# Patient Record
Sex: MALE | Race: WHITE | NOT HISPANIC OR LATINO | Employment: FULL TIME | URBAN - METROPOLITAN AREA
[De-identification: names, ages, dates, MRNs, and addresses within clinical notes are randomized per-mention and may not be internally consistent; named-entity substitution may affect disease eponyms.]

---

## 2022-10-04 ENCOUNTER — OFFICE VISIT (OUTPATIENT)
Dept: PRIMARY CARE CLINIC | Facility: CLINIC | Age: 32
End: 2022-10-04
Payer: COMMERCIAL

## 2022-10-04 VITALS
HEIGHT: 68 IN | TEMPERATURE: 99 F | DIASTOLIC BLOOD PRESSURE: 68 MMHG | WEIGHT: 141.56 LBS | BODY MASS INDEX: 21.45 KG/M2 | OXYGEN SATURATION: 92 % | HEART RATE: 94 BPM | SYSTOLIC BLOOD PRESSURE: 110 MMHG | RESPIRATION RATE: 16 BRPM

## 2022-10-04 DIAGNOSIS — F41.9 ANXIETY AND DEPRESSION: ICD-10-CM

## 2022-10-04 DIAGNOSIS — F32.A ANXIETY AND DEPRESSION: ICD-10-CM

## 2022-10-04 DIAGNOSIS — Z76.89 ENCOUNTER TO ESTABLISH CARE: Primary | ICD-10-CM

## 2022-10-04 DIAGNOSIS — K44.9 HIATAL HERNIA: ICD-10-CM

## 2022-10-04 DIAGNOSIS — K21.00 GASTROESOPHAGEAL REFLUX DISEASE WITH ESOPHAGITIS WITHOUT HEMORRHAGE: ICD-10-CM

## 2022-10-04 PROCEDURE — 1160F RVW MEDS BY RX/DR IN RCRD: CPT | Mod: CPTII,S$GLB,, | Performed by: FAMILY MEDICINE

## 2022-10-04 PROCEDURE — 3074F PR MOST RECENT SYSTOLIC BLOOD PRESSURE < 130 MM HG: ICD-10-PCS | Mod: CPTII,S$GLB,, | Performed by: FAMILY MEDICINE

## 2022-10-04 PROCEDURE — 3074F SYST BP LT 130 MM HG: CPT | Mod: CPTII,S$GLB,, | Performed by: FAMILY MEDICINE

## 2022-10-04 PROCEDURE — 3078F DIAST BP <80 MM HG: CPT | Mod: CPTII,S$GLB,, | Performed by: FAMILY MEDICINE

## 2022-10-04 PROCEDURE — 3008F BODY MASS INDEX DOCD: CPT | Mod: CPTII,S$GLB,, | Performed by: FAMILY MEDICINE

## 2022-10-04 PROCEDURE — 1160F PR REVIEW ALL MEDS BY PRESCRIBER/CLIN PHARMACIST DOCUMENTED: ICD-10-PCS | Mod: CPTII,S$GLB,, | Performed by: FAMILY MEDICINE

## 2022-10-04 PROCEDURE — 1159F MED LIST DOCD IN RCRD: CPT | Mod: CPTII,S$GLB,, | Performed by: FAMILY MEDICINE

## 2022-10-04 PROCEDURE — 3008F PR BODY MASS INDEX (BMI) DOCUMENTED: ICD-10-PCS | Mod: CPTII,S$GLB,, | Performed by: FAMILY MEDICINE

## 2022-10-04 PROCEDURE — 99999 PR PBB SHADOW E&M-NEW PATIENT-LVL IV: ICD-10-PCS | Mod: PBBFAC,,, | Performed by: FAMILY MEDICINE

## 2022-10-04 PROCEDURE — 99204 OFFICE O/P NEW MOD 45 MIN: CPT | Mod: S$GLB,,, | Performed by: FAMILY MEDICINE

## 2022-10-04 PROCEDURE — 3078F PR MOST RECENT DIASTOLIC BLOOD PRESSURE < 80 MM HG: ICD-10-PCS | Mod: CPTII,S$GLB,, | Performed by: FAMILY MEDICINE

## 2022-10-04 PROCEDURE — 99204 PR OFFICE/OUTPT VISIT, NEW, LEVL IV, 45-59 MIN: ICD-10-PCS | Mod: S$GLB,,, | Performed by: FAMILY MEDICINE

## 2022-10-04 PROCEDURE — 1159F PR MEDICATION LIST DOCUMENTED IN MEDICAL RECORD: ICD-10-PCS | Mod: CPTII,S$GLB,, | Performed by: FAMILY MEDICINE

## 2022-10-04 PROCEDURE — 99999 PR PBB SHADOW E&M-NEW PATIENT-LVL IV: CPT | Mod: PBBFAC,,, | Performed by: FAMILY MEDICINE

## 2022-10-04 RX ORDER — HYDROXYZINE HYDROCHLORIDE 10 MG/5ML
SOLUTION ORAL
COMMUNITY
Start: 2022-07-11 | End: 2022-10-04 | Stop reason: ALTCHOICE

## 2022-10-04 RX ORDER — BUPROPION HYDROCHLORIDE 75 MG/1
75 TABLET ORAL 2 TIMES DAILY
Qty: 60 TABLET | Refills: 11 | Status: SHIPPED | OUTPATIENT
Start: 2022-10-04 | End: 2023-12-18

## 2022-10-04 RX ORDER — BUPROPION HYDROCHLORIDE 75 MG/1
TABLET ORAL
COMMUNITY
Start: 2022-07-11 | End: 2022-10-04 | Stop reason: ALTCHOICE

## 2022-10-04 RX ORDER — HYDROXYZINE PAMOATE 25 MG/1
25 CAPSULE ORAL EVERY 8 HOURS PRN
Qty: 90 CAPSULE | Refills: 3 | Status: SHIPPED | OUTPATIENT
Start: 2022-10-04 | End: 2023-05-16 | Stop reason: ALTCHOICE

## 2022-10-04 RX ORDER — VILAZODONE HYDROCHLORIDE 10 MG/1
10 TABLET ORAL DAILY
Qty: 90 TABLET | Refills: 3 | Status: SHIPPED | OUTPATIENT
Start: 2022-10-04 | End: 2023-12-18

## 2022-10-04 RX ORDER — VILAZODONE HYDROCHLORIDE 10 MG/1
TABLET ORAL
COMMUNITY
Start: 2022-07-11 | End: 2022-10-04 | Stop reason: SDUPTHER

## 2022-10-04 NOTE — PROGRESS NOTES
"    [facility]       [address] [place] [address]      Phone: [phone]      Fax: [phone]    Gilberto Saldivar MD                Office Visit  10/04/2022        Subjective      HPI:  Howard Dunn is a 32 y.o. male presents today in clinic for "No chief complaint on file.  ."     32-year-old gentleman presents today to establish care, discussed med refills.      Patient states he recently moved here from [place].  Has a son (age 13) who lives here with his mother.  Decided to move here to be closer to him.    States that overall, he feels okay.  No chest pains, shortness of breath.  No fever, chills, body aches.  No coughing, sneezing, URI type symptoms.  States appetite is normal.  States bowel movements are normal.  Denies any urinary issues.      Has ongoing issues with anxiety and depression.  Has tried several medications in the past.  His PCP at home eventually got him on a regimen including Wellbutrin 75 mg daily, Viibryd 10 mg daily, hydroxyzine as needed for anxiety or sleep.  States this combination works very well.  No issues with it.  Recently filled his last prescription, will need refills soon.      Also has issues with reflux, digestion.  Had EGD in the past.  Was told he had lots of inflammation, small hiatal hernia.  Was given prescription for omeprazole which he took for a short time.  Now he only uses it as needed.  He tries his best to watch his diet.  Does occasionally get some pain or cramps in his stomach at night.  Lots of gas.  Finds that his stools are often inconsistent.  Sometimes he will go multiple times per day, frequently has diarrhea.  No blood in the stool.  No black, tarry stools.    Had blood work done back in March with his PCP.  Everything was normal.      PMH: Anxiety/depression.  GERD.  Hiatal hernia.    PSH: Inguinal hernia repair as a baby.  EGD.    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    T: Denies   A:  Denies current use.  Quit February, 2021.    D: Denies    Exercise:  Walks occasionally      The following were updated and reviewed by myself in the chart: medications, past medical history, past surgical history, family history, social history, and allergies.     Medications:  Current Outpatient Medications on File Prior to Visit   Medication Sig Dispense Refill    [DISCONTINUED] buPROPion (WELLBUTRIN) 75 MG tablet       [DISCONTINUED] hydrOXYzine HCL 10 mg/5 mL (5 mL) Soln       [DISCONTINUED] vilazodone (VIIBRYD) 10 mg Tab tablet        No current facility-administered medications on file prior to visit.        PMHx:  History reviewed. No pertinent past medical history.   There are no problems to display for this patient.       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        Allergies:  Review of patient's allergies indicates:  No Known Allergies     ROS:  Review of Systems   Constitutional:  Negative for activity change, appetite change, chills and fever.   HENT:  Negative for congestion, postnasal drip, rhinorrhea, sore throat and trouble swallowing.    Respiratory:  Negative for cough and shortness of breath.    Cardiovascular:  Negative for chest pain and palpitations.   Gastrointestinal:  Positive for  "abdominal pain and diarrhea. Negative for constipation, nausea and vomiting.   Genitourinary:  Negative for difficulty urinating.   Musculoskeletal:  Negative for arthralgias and myalgias.   Skin:  Negative for color change and rash.   Neurological:  Negative for headaches.   All other systems reviewed and are negative.       Objective      /68   Pulse 94   Temp 98.8 °F (37.1 °C) (Temporal)   Resp 16   Ht 5' 8" (1.727 m)   Wt 64.2 kg (141 lb 8.6 oz)   SpO2 (!) 92%   BMI 21.52 kg/m²   Ht Readings from Last 3 Encounters:   10/04/22 5' 8" (1.727 m)     Wt Readings from Last 3 Encounters:   10/04/22 64.2 kg (141 lb 8.6 oz)       PHYSICAL EXAM:  Physical Exam  Vitals and nursing note reviewed.   Constitutional:       General: He is not in acute distress.     Appearance: Normal appearance.   HENT:      Head: Normocephalic and atraumatic.      Right Ear: Tympanic membrane, ear canal and external ear normal.      Left Ear: Tympanic membrane, ear canal and external ear normal.      Nose: Nose normal. No congestion or rhinorrhea.      Mouth/Throat:      Mouth: Mucous membranes are moist.      Pharynx: Oropharynx is clear. No oropharyngeal exudate or posterior oropharyngeal erythema.   Eyes:      Extraocular Movements: Extraocular movements intact.      Conjunctiva/sclera: Conjunctivae normal.      Pupils: Pupils are equal, round, and reactive to light.   Cardiovascular:      Rate and Rhythm: Normal rate and regular rhythm.   Pulmonary:      Effort: Pulmonary effort is normal.      Breath sounds: No wheezing, rhonchi or rales.   Abdominal:      Tenderness: There is abdominal tenderness (mild) in the epigastric area.   Musculoskeletal:         General: Normal range of motion.      Cervical back: Normal range of motion.   Lymphadenopathy:      Cervical: No cervical adenopathy.   Skin:     General: Skin is warm and dry.   Neurological:      General: No focal deficit present.      Mental Status: He is alert.    "         LABS / IMAGING:  No results found for this or any previous visit (from the past 4368 hour(s)).      Assessment    1. Encounter to establish care    2. Anxiety and depression    3. Gastroesophageal reflux disease with esophagitis without hemorrhage    4. Hiatal hernia          Plan    Diagnoses and all orders for this visit:    Encounter to establish care    Anxiety and depression  -     buPROPion (WELLBUTRIN) 75 MG tablet; Take 1 tablet (75 mg total) by mouth 2 (two) times daily.  -     hydrOXYzine pamoate (VISTARIL) 25 MG Cap; Take 1 capsule (25 mg total) by mouth every 8 (eight) hours as needed (anxiety/sleep).  -     vilazodone (VIIBRYD) 10 mg Tab tablet; Take 1 tablet (10 mg total) by mouth once daily.    Gastroesophageal reflux disease with esophagitis without hemorrhage    Hiatal hernia    Physically, looks okay today.      Reviewed recent blood work.  CBC, CMP, TSH or all WNL.  A1c equals 5.2%.  Hepatitis C negative.  Total cholesterol 149.  Triglycerides 59.  .  HDL 43.    Anxiety and depression appear to be very well controlled on current regimen.    PHQ-9 = 3 today  DESIREE-7 = 1 today    Med refills given.      Continue to manage GERD/hiatal hernia with diet, omeprazole as needed.  If symptoms worsen, can always refer to GI to repeat scope, discuss additional workup.    Follow-up in March/April for recheck, annual wellness exam.      FOLLOW-UP:  Follow up in about 6 months (around 4/4/2023) for check up, annual exam.    I spent a total of 45 minutes face to face and non-face to face on the date of this visit.This includes time preparing to see the patient (eg, review of tests, notes), obtaining and/or reviewing additional history from an independent historian and/or outside medical records, documenting clinical information in the electronic health record, independently interpreting results and/or communicating results to the patient/family/caregiver, or care coordinator.    Signed by:  Gilberto  CEASAR Saldivar MD

## 2022-10-11 ENCOUNTER — TELEPHONE (OUTPATIENT)
Dept: PRIMARY CARE CLINIC | Facility: CLINIC | Age: 32
End: 2022-10-11
Payer: COMMERCIAL

## 2022-10-11 ENCOUNTER — PATIENT MESSAGE (OUTPATIENT)
Dept: PRIMARY CARE CLINIC | Facility: CLINIC | Age: 32
End: 2022-10-11
Payer: COMMERCIAL

## 2022-10-11 DIAGNOSIS — G47.9 SLEEP DIFFICULTIES: Primary | ICD-10-CM

## 2022-10-11 NOTE — TELEPHONE ENCOUNTER
----- Message from Alma Graff sent at 10/11/2022  8:54 AM CDT -----  Contact: self/[phone]  Patient is calling to request a sleep study, he states he forgot to talk to Dr Saldivar on his visit. Please call him back at [phone]. Thanks/ar

## 2023-02-20 ENCOUNTER — OFFICE VISIT (OUTPATIENT)
Dept: PRIMARY CARE CLINIC | Facility: CLINIC | Age: 33
End: 2023-02-20
Payer: COMMERCIAL

## 2023-02-20 VITALS
HEART RATE: 106 BPM | SYSTOLIC BLOOD PRESSURE: 126 MMHG | WEIGHT: 148.38 LBS | TEMPERATURE: 98 F | HEIGHT: 68 IN | BODY MASS INDEX: 22.49 KG/M2 | DIASTOLIC BLOOD PRESSURE: 80 MMHG

## 2023-02-20 DIAGNOSIS — R09.81 SINUS CONGESTION: ICD-10-CM

## 2023-02-20 DIAGNOSIS — G47.9 SLEEP DIFFICULTIES: Primary | ICD-10-CM

## 2023-02-20 DIAGNOSIS — K13.70 MOUTH LESION: ICD-10-CM

## 2023-02-20 PROCEDURE — 1159F PR MEDICATION LIST DOCUMENTED IN MEDICAL RECORD: ICD-10-PCS | Mod: CPTII,S$GLB,, | Performed by: FAMILY MEDICINE

## 2023-02-20 PROCEDURE — 3008F BODY MASS INDEX DOCD: CPT | Mod: CPTII,S$GLB,, | Performed by: FAMILY MEDICINE

## 2023-02-20 PROCEDURE — 99999 PR PBB SHADOW E&M-EST. PATIENT-LVL IV: CPT | Mod: PBBFAC,,, | Performed by: FAMILY MEDICINE

## 2023-02-20 PROCEDURE — 1159F MED LIST DOCD IN RCRD: CPT | Mod: CPTII,S$GLB,, | Performed by: FAMILY MEDICINE

## 2023-02-20 PROCEDURE — 99215 PR OFFICE/OUTPT VISIT, EST, LEVL V, 40-54 MIN: ICD-10-PCS | Mod: S$GLB,,, | Performed by: FAMILY MEDICINE

## 2023-02-20 PROCEDURE — 3008F PR BODY MASS INDEX (BMI) DOCUMENTED: ICD-10-PCS | Mod: CPTII,S$GLB,, | Performed by: FAMILY MEDICINE

## 2023-02-20 PROCEDURE — 3079F PR MOST RECENT DIASTOLIC BLOOD PRESSURE 80-89 MM HG: ICD-10-PCS | Mod: CPTII,S$GLB,, | Performed by: FAMILY MEDICINE

## 2023-02-20 PROCEDURE — 1160F RVW MEDS BY RX/DR IN RCRD: CPT | Mod: CPTII,S$GLB,, | Performed by: FAMILY MEDICINE

## 2023-02-20 PROCEDURE — 1160F PR REVIEW ALL MEDS BY PRESCRIBER/CLIN PHARMACIST DOCUMENTED: ICD-10-PCS | Mod: CPTII,S$GLB,, | Performed by: FAMILY MEDICINE

## 2023-02-20 PROCEDURE — 99215 OFFICE O/P EST HI 40 MIN: CPT | Mod: S$GLB,,, | Performed by: FAMILY MEDICINE

## 2023-02-20 PROCEDURE — 3074F PR MOST RECENT SYSTOLIC BLOOD PRESSURE < 130 MM HG: ICD-10-PCS | Mod: CPTII,S$GLB,, | Performed by: FAMILY MEDICINE

## 2023-02-20 PROCEDURE — 3079F DIAST BP 80-89 MM HG: CPT | Mod: CPTII,S$GLB,, | Performed by: FAMILY MEDICINE

## 2023-02-20 PROCEDURE — 99999 PR PBB SHADOW E&M-EST. PATIENT-LVL IV: ICD-10-PCS | Mod: PBBFAC,,, | Performed by: FAMILY MEDICINE

## 2023-02-20 PROCEDURE — 3074F SYST BP LT 130 MM HG: CPT | Mod: CPTII,S$GLB,, | Performed by: FAMILY MEDICINE

## 2023-02-20 RX ORDER — TRIAMCINOLONE ACETONIDE 1 MG/G
PASTE DENTAL
Qty: 5 G | Refills: 0 | Status: SHIPPED | OUTPATIENT
Start: 2023-02-20 | End: 2023-05-16

## 2023-02-20 NOTE — PROGRESS NOTES
"    [facility]       [address] [place] [address]      Phone: [phone]      Fax: [phone]    Gilberto Saldivar MD                Office Visit  02/20/2023        Subjective      HPI:  Howard Dunn is a 33 y.o. male presents today in clinic for "No chief complaint on file.  ."     33-year-old gentleman presents today to discuss a couple of issues.      First, he wants to discuss sleep issues.  Has had issues sleeping for several years now.  Before he moved here from [place] he was supposed to get a sleep study done, but moving here made him cancel that.  His had several sleep partners over the last few years.  They all report that he sleeps funny at night.  Has these episodes where his breathing will get real rapid, then it will stop completely.  No reports of him snoring, nor waking up gasping for breath.  Sometimes, when he wakes up in the morning, his heart feels like it is beating quickly.    He states that sometimes his anxieties we will keep him from falling asleep.  He does take Vistaril, as needed, during the day for anxiety.  Sometimes, has to take two of them at bedtime to help fall asleep.  Lately, has been using OTC Zyrtec at bedtime, which also helps.  Otherwise, he is doing okay on his Wellbutrin, Viibryd regimen.    Separately, he reports getting ulcers in his mouth.  Happens frequently.  Sometimes multiple.  Has been going on for several months now.  They appear, get painful, annoying.  They resolve, then pop up again in a new location.  They occur on the inside of his cheeks, tongue, upper lip, etc..  The ones on the tongue tend to look more solid in nature.    He also reports intermittent sinus congestion.  Lately, his right nostril feels like it is constantly clogged with mucus.  He blows out snot, blood, etc..    PMH: Anxiety/depression.  GERD.  Hiatal hernia.    PSH: Inguinal hernia repair as a baby.  EGD.    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    T: Denies   A:  Denies current use.  Quit February, 2021.    D: Denies    Exercise:  Walks occasionally      The following were updated and reviewed by myself in the chart: medications, past medical history, past surgical history, family history, social history, and allergies.     Medications:  Current Outpatient Medications on File Prior to Visit   Medication Sig Dispense Refill    buPROPion (WELLBUTRIN) 75 MG tablet Take 1 tablet (75 mg total) by mouth 2 (two) times daily. 60 tablet 11    hydrOXYzine pamoate (VISTARIL) 25 MG Cap Take 1 capsule (25 mg total) by mouth every 8 (eight) hours as needed (anxiety/sleep). 90 capsule 3    vilazodone (VIIBRYD) 10 mg Tab tablet Take 1 tablet (10 mg total) by mouth once daily. 90 tablet 3     No current facility-administered medications on file prior to visit.        PMHx:  Past Medical History:   Diagnosis Date    Anxiety       There are no problems to display for this patient.       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        Allergies:  Review of patient's allergies indicates:  No Known Allergies     ROS:  Review of Systems   Constitutional:  Negative for activity change, appetite change, chills and fever.   HENT:  Positive for congestion.  "Negative for postnasal drip, rhinorrhea, sore throat and trouble swallowing.    Respiratory:  Negative for cough and shortness of breath.    Cardiovascular:  Negative for chest pain and palpitations.   Gastrointestinal:  Negative for abdominal pain, constipation, diarrhea, nausea and vomiting.   Genitourinary:  Negative for difficulty urinating.   Musculoskeletal:  Negative for arthralgias and myalgias.   Skin:  Negative for color change and rash.   Neurological:  Negative for headaches.   Psychiatric/Behavioral:  Positive for sleep disturbance. The patient is nervous/anxious.    All other systems reviewed and are negative.       Objective      /80   Pulse 106   Temp 98 °F (36.7 °C)   Ht 5' 8" (1.727 m)   Wt 67.3 kg (148 lb 5.9 oz)   BMI 22.56 kg/m²   Ht Readings from Last 3 Encounters:   02/20/23 5' 8" (1.727 m)   10/04/22 5' 8" (1.727 m)     Wt Readings from Last 3 Encounters:   02/20/23 67.3 kg (148 lb 5.9 oz)   10/04/22 64.2 kg (141 lb 8.6 oz)       PHYSICAL EXAM:  Physical Exam  Vitals and nursing note reviewed.   Constitutional:       General: He is not in acute distress.     Appearance: Normal appearance.   HENT:      Head: Normocephalic and atraumatic.      Right Ear: Tympanic membrane, ear canal and external ear normal.      Left Ear: Tympanic membrane, ear canal and external ear normal.      Nose: Nose normal. No congestion or rhinorrhea.      Mouth/Throat:      Mouth: Mucous membranes are moist. Oral lesions present.      Pharynx: Oropharynx is clear. No oropharyngeal exudate or posterior oropharyngeal erythema.      Comments: Inside of right, upper lip, has what appears to be a 1 cm x 0.5 cm aphthous ulcer.  Eyes:      Extraocular Movements: Extraocular movements intact.      Conjunctiva/sclera: Conjunctivae normal.      Pupils: Pupils are equal, round, and reactive to light.   Cardiovascular:      Rate and Rhythm: Normal rate and regular rhythm.   Pulmonary:      Effort: Pulmonary effort is " normal.      Breath sounds: No wheezing, rhonchi or rales.   Musculoskeletal:         General: Normal range of motion.      Cervical back: Normal range of motion.   Lymphadenopathy:      Cervical: No cervical adenopathy.   Skin:     General: Skin is warm and dry.   Neurological:      General: No focal deficit present.      Mental Status: He is alert.            LABS / IMAGING:  No results found for this or any previous visit (from the past 4368 hour(s)).      Assessment    1. Sleep difficulties    2. Mouth lesion    3. Sinus congestion          Plan    Diagnoses and all orders for this visit:    Sleep difficulties  -     Home Sleep Studies; Future    Mouth lesion  -     Ambulatory referral/consult to ENT; Future  -     triamcinolone acetonide 0.1% (KENALOG) 0.1 % paste; Place small amount on oral lesions one or 2 times daily, as needed for pain.  Do not rub in.    Sinus congestion  -     Ambulatory referral/consult to ENT; Future    Referral to Sleep Medicine was placed back in October.  He has appointment scheduled for February 28.  Recommended he keep that appointment.  Will attempt to place order today for home sleep study.  Hopefully, we will be able to do the sleep study before his appointment and they can discuss the results together.    Lesions do look like aphthous ulcers, but with the frequency, recurrent nature, and absence of any URI type symptoms, would like him to see ENT for 2nd opinion, possible biopsy.  In the meantime, will have him try Kenalog in [place] to see if that helps with the pain, inflammation.      FOLLOW-UP:  Follow up if symptoms worsen or fail to improve.    I spent a total of 45 minutes face to face and non-face to face on the date of this visit.This includes time preparing to see the patient (eg, review of tests, notes), obtaining and/or reviewing additional history from an independent historian and/or outside medical records, documenting clinical information in the electronic health  record, independently interpreting results and/or communicating results to the patient/family/caregiver, or care coordinator.    Signed by:  Gilberto Saldivar MD

## 2023-02-20 NOTE — PATIENT INSTRUCTIONS
Physically, everything looks pretty good today.      You have an appointment with the Sleep Clinic on February 28 at 2:00 p.m..  Please keep this appointment.    I will place an order for a home sleep study today.  I marked it urgent, so hopefully, we can get this completed before your appointment with the Sleep Clinic.    For the oral ulcers, I sent an anti-inflammatory paced to the pharmacy.  Use your finger to apply a small amount to the lesion once or twice a day.  Just apply it and let sit.  Do not rub in.    Would like you to see the ENT to take another look at these lesions.  Referral placed today.    We are due for wellness exam in early April.  This afternoon, I will place orders to get blood work done prior to that visit.  Feel free to stop by the clinic, at your convenience, to get the blood drawn.  We will discuss the results at your next visit.

## 2023-02-28 ENCOUNTER — OFFICE VISIT (OUTPATIENT)
Dept: SLEEP MEDICINE | Facility: CLINIC | Age: 33
End: 2023-02-28
Payer: COMMERCIAL

## 2023-02-28 ENCOUNTER — OFFICE VISIT (OUTPATIENT)
Dept: OTOLARYNGOLOGY | Facility: CLINIC | Age: 33
End: 2023-02-28
Payer: COMMERCIAL

## 2023-02-28 VITALS
OXYGEN SATURATION: 98 % | DIASTOLIC BLOOD PRESSURE: 88 MMHG | HEIGHT: 68 IN | WEIGHT: 147.5 LBS | RESPIRATION RATE: 18 BRPM | SYSTOLIC BLOOD PRESSURE: 140 MMHG | BODY MASS INDEX: 22.35 KG/M2 | HEART RATE: 120 BPM

## 2023-02-28 VITALS — WEIGHT: 148 LBS | BODY MASS INDEX: 22.5 KG/M2 | TEMPERATURE: 98 F

## 2023-02-28 DIAGNOSIS — G47.33 OSA (OBSTRUCTIVE SLEEP APNEA): ICD-10-CM

## 2023-02-28 DIAGNOSIS — R09.81 SINUS CONGESTION: ICD-10-CM

## 2023-02-28 DIAGNOSIS — K12.0 RECURRENT APHTHOUS ULCER: Primary | ICD-10-CM

## 2023-02-28 DIAGNOSIS — J30.9 ALLERGIC RHINITIS, UNSPECIFIED SEASONALITY, UNSPECIFIED TRIGGER: ICD-10-CM

## 2023-02-28 DIAGNOSIS — F41.9 ANXIETY: ICD-10-CM

## 2023-02-28 DIAGNOSIS — K13.70 MOUTH LESION: ICD-10-CM

## 2023-02-28 DIAGNOSIS — R00.2 PALPITATIONS: Primary | ICD-10-CM

## 2023-02-28 DIAGNOSIS — G47.9 SLEEP DIFFICULTIES: ICD-10-CM

## 2023-02-28 DIAGNOSIS — R00.0 TACHYCARDIA: ICD-10-CM

## 2023-02-28 DIAGNOSIS — J34.89 NASAL OBSTRUCTION: ICD-10-CM

## 2023-02-28 DIAGNOSIS — J34.2 NASAL SEPTAL DEVIATION: ICD-10-CM

## 2023-02-28 DIAGNOSIS — G47.00 INSOMNIA, UNSPECIFIED TYPE: ICD-10-CM

## 2023-02-28 PROCEDURE — 99999 PR PBB SHADOW E&M-EST. PATIENT-LVL III: CPT | Mod: PBBFAC,,, | Performed by: STUDENT IN AN ORGANIZED HEALTH CARE EDUCATION/TRAINING PROGRAM

## 2023-02-28 PROCEDURE — 3008F PR BODY MASS INDEX (BMI) DOCUMENTED: ICD-10-PCS | Mod: CPTII,S$GLB,, | Performed by: NURSE PRACTITIONER

## 2023-02-28 PROCEDURE — 3079F PR MOST RECENT DIASTOLIC BLOOD PRESSURE 80-89 MM HG: ICD-10-PCS | Mod: CPTII,S$GLB,, | Performed by: NURSE PRACTITIONER

## 2023-02-28 PROCEDURE — 1159F PR MEDICATION LIST DOCUMENTED IN MEDICAL RECORD: ICD-10-PCS | Mod: CPTII,S$GLB,, | Performed by: NURSE PRACTITIONER

## 2023-02-28 PROCEDURE — 99204 OFFICE O/P NEW MOD 45 MIN: CPT | Mod: S$GLB,,, | Performed by: NURSE PRACTITIONER

## 2023-02-28 PROCEDURE — 99999 PR PBB SHADOW E&M-EST. PATIENT-LVL III: ICD-10-PCS | Mod: PBBFAC,,, | Performed by: STUDENT IN AN ORGANIZED HEALTH CARE EDUCATION/TRAINING PROGRAM

## 2023-02-28 PROCEDURE — 99204 PR OFFICE/OUTPT VISIT, NEW, LEVL IV, 45-59 MIN: ICD-10-PCS | Mod: S$GLB,,, | Performed by: NURSE PRACTITIONER

## 2023-02-28 PROCEDURE — 1160F RVW MEDS BY RX/DR IN RCRD: CPT | Mod: CPTII,S$GLB,, | Performed by: NURSE PRACTITIONER

## 2023-02-28 PROCEDURE — 99999 PR PBB SHADOW E&M-EST. PATIENT-LVL IV: ICD-10-PCS | Mod: PBBFAC,,, | Performed by: NURSE PRACTITIONER

## 2023-02-28 PROCEDURE — 3008F BODY MASS INDEX DOCD: CPT | Mod: CPTII,S$GLB,, | Performed by: NURSE PRACTITIONER

## 2023-02-28 PROCEDURE — 3077F PR MOST RECENT SYSTOLIC BLOOD PRESSURE >= 140 MM HG: ICD-10-PCS | Mod: CPTII,S$GLB,, | Performed by: NURSE PRACTITIONER

## 2023-02-28 PROCEDURE — 1159F MED LIST DOCD IN RCRD: CPT | Mod: CPTII,S$GLB,, | Performed by: NURSE PRACTITIONER

## 2023-02-28 PROCEDURE — 3079F DIAST BP 80-89 MM HG: CPT | Mod: CPTII,S$GLB,, | Performed by: NURSE PRACTITIONER

## 2023-02-28 PROCEDURE — 3077F SYST BP >= 140 MM HG: CPT | Mod: CPTII,S$GLB,, | Performed by: NURSE PRACTITIONER

## 2023-02-28 PROCEDURE — 1160F PR REVIEW ALL MEDS BY PRESCRIBER/CLIN PHARMACIST DOCUMENTED: ICD-10-PCS | Mod: CPTII,S$GLB,, | Performed by: NURSE PRACTITIONER

## 2023-02-28 PROCEDURE — 99204 PR OFFICE/OUTPT VISIT, NEW, LEVL IV, 45-59 MIN: ICD-10-PCS | Mod: S$GLB,,, | Performed by: STUDENT IN AN ORGANIZED HEALTH CARE EDUCATION/TRAINING PROGRAM

## 2023-02-28 PROCEDURE — 99204 OFFICE O/P NEW MOD 45 MIN: CPT | Mod: S$GLB,,, | Performed by: STUDENT IN AN ORGANIZED HEALTH CARE EDUCATION/TRAINING PROGRAM

## 2023-02-28 PROCEDURE — 99999 PR PBB SHADOW E&M-EST. PATIENT-LVL IV: CPT | Mod: PBBFAC,,, | Performed by: NURSE PRACTITIONER

## 2023-02-28 RX ORDER — LEVOCETIRIZINE DIHYDROCHLORIDE 5 MG/1
5 TABLET, FILM COATED ORAL NIGHTLY
Qty: 30 TABLET | Refills: 11 | Status: SHIPPED | OUTPATIENT
Start: 2023-02-28 | End: 2023-05-16

## 2023-02-28 RX ORDER — FLUTICASONE PROPIONATE 50 MCG
1 SPRAY, SUSPENSION (ML) NASAL DAILY
Qty: 16 G | Refills: 0 | Status: SHIPPED | OUTPATIENT
Start: 2023-02-28 | End: 2023-05-16

## 2023-02-28 NOTE — PROGRESS NOTES
"Subjective:      Patient ID: Howard Dunn is a 33 y.o. male.    Chief Complaint: Sleep Apnea    HPI    Patient presents to the office today for evaluation of sleep.  Patient with snoring and witnessed apneas. Problems sleeping with poor sleep hygiene. No sleep schedule and electronics on in bedroom.   [place] Sleepiness Scale score 6.  Patient has had symptoms for a few years. He is waking up startled, gasping and with palpitations.   Bedtime: randomPM  Wake time: randomAM    STOP - BANG Questionnaire:     1. Snoring : Do you snore loudly ?    Yes    2. Tired : Do you often feel tired, fatigued, or sleepy during daytime?   Yes    3. Observed: Has anyone observed you stop breathing during your sleep?   Yes    4. Blood pressure : Do you have or are you being treated for high blood pressure?   No    5. BMI :BMI more than 35 kg/m2?   No    6. Age : Age over 50 yr old?   No    7. Neck circumference: Neck circumference greater than 40 cm?   No    8. Gender: Gender male?   Yes    High risk of RIVKA: Yes 5 - 8  Intermediate risk of RIVKA: Yes 3 - 4  Low risk of RIVKA: Yes 0 - 2      References:   STOP Questionnaire   A Tool to Screen Patients for Obstructive Sleep Apnea: SRINIVASAN Shepherd.LITO.C.P.C., Rivera Helms M.B.B.S., Jenae Webb M.D.,Brittani Espino, Ph.D., Fahad Concepcion M.B.B.S.,_ Minh Tidwell.,_ Rhonda Bauer M.D., Mason Hand F.R.C.P.C.; Anesthesiology 2008; 108:812-21 Copyright © 2008, the American Society of Anesthesiologists, Inc. [employer]    BP (!) 140/88   Pulse (!) 120   Resp 18   Ht 5' 8" (1.727 m)   Wt 66.9 kg (147 lb 7.8 oz)   SpO2 98%   BMI 22.43 kg/m²   Body mass index is 22.43 kg/m².    Review of Systems   Constitutional: Negative.    HENT: Negative.     Respiratory:  Positive for apnea.    Cardiovascular:  Positive for palpitations.   Musculoskeletal: Negative.    Gastrointestinal: Negative.    Neurological: Negative.    Psychiatric/Behavioral:  " Positive for sleep disturbance. The patient is nervous/anxious.        Objective:      Physical Exam  Constitutional:       Appearance: Normal appearance. He is well-developed.   HENT:      Head: Normocephalic and atraumatic.      Nose: Nose normal.      Mouth/Throat:      Pharynx: Oropharynx is clear.   Neck:      Thyroid: No thyroid mass or thyromegaly.      Trachea: Trachea normal.   Cardiovascular:      Rate and Rhythm: Regular rhythm. Tachycardia present.      Heart sounds: Normal heart sounds.   Pulmonary:      Effort: Pulmonary effort is normal.      Breath sounds: Normal breath sounds. No wheezing, rhonchi or rales.   Abdominal:      Palpations: Abdomen is soft. There is no splenomegaly or mass.      Tenderness: There is no abdominal tenderness.   Musculoskeletal:         General: Normal range of motion.      Cervical back: Normal range of motion and neck supple.   Skin:     General: Skin is warm and dry.   Neurological:      Mental Status: He is alert and oriented to person, place, and time.   Psychiatric:         Mood and Affect: Mood is anxious.         Behavior: Behavior normal.           Assessment:     1. Palpitations    2. Sleep difficulties    3. RIVKA (obstructive sleep apnea)    4. Anxiety    5. Insomnia, unspecified type    6. Tachycardia       Outpatient Encounter Medications as of 2/28/2023   Medication Sig Dispense Refill    buPROPion (WELLBUTRIN) 75 MG tablet Take 1 tablet (75 mg total) by mouth 2 (two) times daily. 60 tablet 11    fluticasone propionate (FLONASE) 50 mcg/actuation nasal spray 1 spray (50 mcg total) by Each Nostril route once daily. 16 g 0    hydrOXYzine pamoate (VISTARIL) 25 MG Cap Take 1 capsule (25 mg total) by mouth every 8 (eight) hours as needed (anxiety/sleep). 90 capsule 3    levocetirizine (XYZAL) 5 MG tablet Take 1 tablet (5 mg total) by mouth every evening. 30 tablet 11    triamcinolone acetonide 0.1% (KENALOG) 0.1 % paste Place small amount on oral lesions one or 2  times daily, as needed for pain.  Do not rub in. 5 g 0    vilazodone (VIIBRYD) 10 mg Tab tablet Take 1 tablet (10 mg total) by mouth once daily. 90 tablet 3     No facility-administered encounter medications on file as of 2/28/2023.     Orders Placed This Encounter   Procedures    EKG 12-lead     Standing Status:   Future     Standing Expiration Date:   2/28/2024    Polysomnogram (CPAP will be added if patient meets diagnostic criteria.)     Standing Status:   Future     Standing Expiration Date:   2/28/2024     Plan:     Problem List Items Addressed This Visit    None  Visit Diagnoses       Palpitations    -  Primary    Relevant Orders    Polysomnogram (CPAP will be added if patient meets diagnostic criteria.)    Sleep difficulties        Relevant Orders    Polysomnogram (CPAP will be added if patient meets diagnostic criteria.)    RIVKA (obstructive sleep apnea)        Relevant Orders    Polysomnogram (CPAP will be added if patient meets diagnostic criteria.)    Anxiety        Insomnia, unspecified type        Tachycardia        Relevant Orders    EKG 12-lead        Avoid caffeine and energy drinks.   Sleep scheduling  No electronics in bedroom (improve sleep hygiene).

## 2023-02-28 NOTE — PROGRESS NOTES
Chief complaint:    Chief Complaint   Patient presents with    Other     Mouth lesion located on inside of upper lip on the left side.  This one has been present for a few weeks - occurs repeatedly in different locations in his mouth - can have more than one at a time           Referring Provider:  Gilberto Saldivar Md  [address] [place] [address]      History of present illness:     Mr. Dunn is a 33 y.o. presenting for evaluation of nasal obstruction and oral ulcers.       Oral ulceration noted - can be several at a time  Usually no pain or mild, no bleeding   Has had them on and off for some months  Becoming more frequent  Usually last about 1-2 weeks  Does drink a lot of acidic drinks which he feels triggers it  No oral or genital ulcers  Otherwise healthy - no AI or immune diseases  Did not try the steroid cream prescribed  Not a smoker    The patient reports the following allergy/sinus symptoms:     Major sinusitis symptoms:  No - Purulent anterior nasal discharge  No - Purulent or discolored posterior nasal discharge  Yes - Nasal congestion or obstruction (bilateral, all the time)  No - Facial Congestion or fullness  No - Hyposmia or anosmia  No - Fever    Symptoms have been present for many years  The patient has had about 0 sinus infections in the past 12 months.   Prior sinus surgery: no.    Allergy history: yes, thinks he has dog allergies. Allergy testing for this patient has not been done. Treatment has included: benadryl   oral antihistamine, nasal steroid spray    Asthma history: No    NSAID/ASA allergy: No     Current smoker:  No       History      Past Medical History:   Past Medical History:   Diagnosis Date    Anxiety          Past Surgical History:  Past Surgical History:   Procedure Laterality Date    HERNIA REPAIR      as an infant         Medications: Medication list reviewed. He  has a current medication list which includes the following prescription(s): bupropion,  hydroxyzine pamoate, triamcinolone acetonide 0.1%, vilazodone, fluticasone propionate, and levocetirizine.     Allergies: Review of patient's allergies indicates:  No Known Allergies      Family history: family history includes Alcohol abuse in his father; Bipolar disorder in his father; COPD in his father; No Known Problems in his mother.         Social History          Alcohol use:  reports that he does not currently use alcohol.            Tobacco:  reports that he has never smoked. He has never been exposed to tobacco smoke. He has never used smokeless tobacco.         Physical Examination      Vitals: Temperature 98.2 °F (36.8 °C), temperature source Temporal, weight 67.1 kg (148 lb).      General: Well developed, well nourished, well hydrated.     Voice: no dysphonia, no dysarthria      Head/Face: Normocephalic, atraumatic. No scars or lesions. Facial musculature equal.     Eyes: No scleral icterus or conjunctival hemorrhage. EOMI. PERRLA.     Ears:     Right ear: No gross deformity. EAC is clear of debris and erythema. TM are intact with a pneumatized middle ear. No signs of retraction, fluid or infection.      Left ear: No gross deformity. EAC is clear of debris and erythema. TM are intact with a pneumatized middle ear. No signs of retraction, fluid or infection.      Nose: No gross deformity or lesions. No purulent discharge.  Moderate right septal deviation. Inferior turbinate hypertrophy     Mouth/Oropharynx: ulceration about .8 cm of right upper lip near commissure, No other mucosal lesions within the oropharynx. No tonsillar exudate or lesions. Pharyngeal walls symmetrical. Uvula midline. Tongue midline without lesions.     Neurologic: Moving all extremities without gross abnormality.CN II-XII grossly intact. House-Brackmann 1/6. No signs of nystagmus.          Data reviewed      Review of records:      I reviewed records from the referring provider's office visits describing the history, workup, and/or  treatment of this problem thus far.    PCP 2/20/23  Sleep difficulties  -     Home Sleep Studies; Future     Mouth lesion  -     Ambulatory referral/consult to ENT; Future  -     triamcinolone acetonide 0.1% (KENALOG) 0.1 % paste; Place small amount on oral lesions one or 2 times daily, as needed for pain.  Do not rub in.     Sinus congestion  -     Ambulatory referral/consult to ENT; Future     Referral to Sleep Medicine was placed back in October.  He has appointment scheduled for February 28.  Recommended he keep that appointment.  Will attempt to place order today for home sleep study.  Hopefully, we will be able to do the sleep study before his appointment and they can discuss the results together.    Lesions do look like aphthous ulcers, but with the frequency, recurrent nature, and absence of any URI type symptoms, would like him to see ENT for 2nd opinion, possible biopsy.  In the meantime, will have him try Kenalog in Orabase to see if that helps with the pain, inflammation.       Assessment/Plan:    1. Allergic rhinitis, unspecified seasonality, unspecified trigger    2. Mouth lesion    3. Sinus congestion    4. Recurrent aphthous ulcer          Based on the history and examination, I suspect recurrent recurrent aphthous ulcers. No concerning ocular or genital lesions.    I recommend he complete his treatment with triamcinolone in Or[place] paste for 10 days  Also warrants basic labs complete blood count (to assess for neutropenia) - has labs coming up in April, if not resolved by then will follow up labs at that time    We will plan for follow up in about 4-6 weeks. Please return sooner if symptoms worsen. Consider bx if not resolved after topical treatment      Will start flonase, saline, xzyal  Reassess symptoms at follow up  Consider RAST +/- septoplasty after initial medical treatment         Darrian Mercedes MD  [facility]   [facility]  [address] [address]  P: [phone]  F: [phone]

## 2023-03-07 ENCOUNTER — HOSPITAL ENCOUNTER (OUTPATIENT)
Dept: CARDIOLOGY | Facility: HOSPITAL | Age: 33
Discharge: HOME | End: 2023-03-07
Payer: COMMERCIAL

## 2023-03-07 DIAGNOSIS — R00.0 TACHYCARDIA: ICD-10-CM

## 2023-03-07 PROCEDURE — 93010 ELECTROCARDIOGRAM REPORT: CPT | Mod: ,,, | Performed by: INTERNAL MEDICINE

## 2023-03-07 PROCEDURE — 93005 ELECTROCARDIOGRAM TRACING: CPT

## 2023-03-07 PROCEDURE — 93010 EKG 12-LEAD: ICD-10-PCS | Mod: ,,, | Performed by: INTERNAL MEDICINE

## 2023-03-15 DIAGNOSIS — G47.33 OSA (OBSTRUCTIVE SLEEP APNEA): Primary | ICD-10-CM

## 2023-03-16 ENCOUNTER — TELEPHONE (OUTPATIENT)
Dept: SLEEP MEDICINE | Facility: CLINIC | Age: 33
End: 2023-03-16
Payer: COMMERCIAL

## 2023-03-31 ENCOUNTER — TELEPHONE (OUTPATIENT)
Dept: PRIMARY CARE CLINIC | Facility: CLINIC | Age: 33
End: 2023-03-31
Payer: COMMERCIAL

## 2023-03-31 NOTE — TELEPHONE ENCOUNTER
----- Message from Mendez Sneed sent at 3/31/2023  1:33 PM CDT -----  Pt is calling in regards to rescheduling his annual appt. Please call pt back at [phone]. Thanks KB

## 2023-03-31 NOTE — TELEPHONE ENCOUNTER
----- Message from Mendez Sneed sent at 3/31/2023  1:33 PM CDT -----  Pt is calling in regards to rescheduling his annual appt. Please call pt back at [phone]. Thanks KB

## 2023-05-11 ENCOUNTER — PATIENT MESSAGE (OUTPATIENT)
Dept: PULMONOLOGY | Facility: CLINIC | Age: 33
End: 2023-05-11
Payer: COMMERCIAL

## 2023-05-16 ENCOUNTER — OFFICE VISIT (OUTPATIENT)
Dept: PRIMARY CARE CLINIC | Facility: CLINIC | Age: 33
End: 2023-05-16
Payer: COMMERCIAL

## 2023-05-16 VITALS
BODY MASS INDEX: 22.55 KG/M2 | HEART RATE: 143 BPM | TEMPERATURE: 98 F | HEIGHT: 68 IN | SYSTOLIC BLOOD PRESSURE: 148 MMHG | WEIGHT: 148.81 LBS | DIASTOLIC BLOOD PRESSURE: 94 MMHG

## 2023-05-16 DIAGNOSIS — Z00.00 ANNUAL PHYSICAL EXAM: Primary | ICD-10-CM

## 2023-05-16 DIAGNOSIS — R25.3 MUSCLE TWITCHING: ICD-10-CM

## 2023-05-16 DIAGNOSIS — F41.9 ANXIETY AND DEPRESSION: ICD-10-CM

## 2023-05-16 DIAGNOSIS — F32.A ANXIETY AND DEPRESSION: ICD-10-CM

## 2023-05-16 DIAGNOSIS — Z13.29 THYROID DISORDER SCREENING: ICD-10-CM

## 2023-05-16 DIAGNOSIS — F95.0 TRANSIENT MOTOR TIC: ICD-10-CM

## 2023-05-16 DIAGNOSIS — G47.9 SLEEP DIFFICULTIES: ICD-10-CM

## 2023-05-16 DIAGNOSIS — Z13.6 ENCOUNTER FOR LIPID SCREENING FOR CARDIOVASCULAR DISEASE: ICD-10-CM

## 2023-05-16 DIAGNOSIS — R00.0 TACHYCARDIA: ICD-10-CM

## 2023-05-16 DIAGNOSIS — R36.1 HEMATOSPERMIA: ICD-10-CM

## 2023-05-16 DIAGNOSIS — Z11.3 SCREENING FOR STD (SEXUALLY TRANSMITTED DISEASE): ICD-10-CM

## 2023-05-16 DIAGNOSIS — Z13.220 ENCOUNTER FOR LIPID SCREENING FOR CARDIOVASCULAR DISEASE: ICD-10-CM

## 2023-05-16 PROCEDURE — 99395 PREV VISIT EST AGE 18-39: CPT | Mod: S$GLB,,, | Performed by: FAMILY MEDICINE

## 2023-05-16 PROCEDURE — 99999 PR PBB SHADOW E&M-EST. PATIENT-LVL IV: ICD-10-PCS | Mod: PBBFAC,,, | Performed by: FAMILY MEDICINE

## 2023-05-16 PROCEDURE — 99395 PR PREVENTIVE VISIT,EST,18-39: ICD-10-PCS | Mod: S$GLB,,, | Performed by: FAMILY MEDICINE

## 2023-05-16 PROCEDURE — 99999 PR PBB SHADOW E&M-EST. PATIENT-LVL IV: CPT | Mod: PBBFAC,,, | Performed by: FAMILY MEDICINE

## 2023-05-16 RX ORDER — LORAZEPAM 0.5 MG/1
0.5 TABLET ORAL EVERY 12 HOURS PRN
Qty: 60 TABLET | Refills: 0 | Status: SHIPPED | OUTPATIENT
Start: 2023-05-16 | End: 2023-12-18

## 2023-05-16 RX ORDER — PROPRANOLOL HYDROCHLORIDE 60 MG/1
60 CAPSULE, EXTENDED RELEASE ORAL DAILY
Qty: 30 CAPSULE | Refills: 11 | Status: SHIPPED | OUTPATIENT
Start: 2023-05-16 | End: 2023-12-18 | Stop reason: SDUPTHER

## 2023-05-16 NOTE — PROGRESS NOTES
[facility]       [address] [place] [address]      Phone: [phone]      Fax: [phone]    Gilberto Saldivar MD    Office Visit  05/16/2023    Follow-up      33-year-old gentleman presents today for annual wellness exam.      Patient states that his anxiety is up a bit today.  As result, his heart rate and blood pressure are both elevated.  States this happens from time to time, somewhat regularly.  When he gets in his head, especially about a specific topic, anxiety gets worse, heart rate goes up, involuntary movements start.  Has been taking his Wellbutrin, Viibryd regularly.  Not sure if it is working as well.  He feels a bit more sad lately.  Has been using Vistaril as well, but not really helping.    Also reports some blood in his semen couple of times, most recently over the weekend.  No blood in the urine.  No pain with ejaculation, urination.  Was sexually active with a new partner several months ago.  Unsure if that person has STDs/symptoms.  Would like to get checked.    Since last visit he saw sleep Clinic.  They ordered a home sleep study, but his insurance changed, so he never completed it.  Would like to get the order replaced to restart the process.      Also saw ENT.  Was diagnosed with aphthous ulcers, allergies.  Never really took the allergy medications.  Ulcers improved.      PMH: Anxiety/depression.  GERD.  Hiatal hernia.    PSH: Inguinal hernia repair as a baby.  EGD.    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    T: Denies   A:  Denies current use.  Quit February, 2021.    D: Denies    Exercise:  Walks occasionally      Immunizations:    There is no immunization history on file for this patient.    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       Tobacco History:  Social History     Tobacco Use   Smoking Status Never    Passive exposure: Never   Smokeless Tobacco Never       Family History:  Family History   Problem Relation Age of Onset    No Known Problems Mother     COPD Father     Alcohol abuse Father     Bipolar disorder Father        Surgical History:  Past Surgical History:   Procedure Laterality Date    HERNIA REPAIR      as an infant       Allergies:  Review of patient's allergies indicates:  No Known Allergies    Medications:    Current Outpatient Medications:     buPROPion (WELLBUTRIN) 75 MG tablet, Take 1 tablet (75 mg total) by mouth 2 (two) times daily., Disp: 60 tablet, Rfl: 11    vilazodone (VIIBRYD) 10 mg Tab tablet, Take 1 tablet (10 mg total) by mouth once daily., Disp: 90 tablet, Rfl: 3    LORazepam (ATIVAN) 0.5 MG tablet, Take 1 tablet (0.5 mg total) by mouth every 12 (twelve) hours as needed for Anxiety (or spasms)., Disp: 60 tablet, Rfl: 0    propranoloL (INDERAL [place]) 60 MG 24 hr capsule, Take 1 capsule (60 mg total) by mouth once daily., Disp: 30 capsule, Rfl: 11    Health Maintenance:  Health Maintenance   Topic Date Due    Hepatitis C Screening  Never done    TETANUS VACCINE  Never done    Lipid Panel  Completed       Screening Questions  1.  Do you smoke? No  2.  In the past month have you been bothered by feeling "down", depressed or " "hopeless? Yes  3.  In the past month, have you experienced a loss of interest or pleasure in doing things? Yes  4.  In the past 3 months, on any single occasion have you had 5 or more drinks containing alcohol? No  5.  Regarding your use of alcohol, have you ever felt you should cut down on your drinking? No  6.  Are you sexually active? Yes  7   Do you exercise?  intermittently    Counseling  The patient was counseled regarding diet and exercise, motor vehicle safety, sun exposure, and use of vitamins and supplements.  The patient was counseled regarding Living Will/Durable Power-Of-.  The patient was given information regarding the dangers of smoking and the overuse of alcohol.    Review of Systems     Objective   Vitals:    05/16/23 1255   BP: (!) 148/94   Pulse: (!) 143   Temp: 97.8 °F (36.6 °C)   Weight: 67.5 kg (148 lb 13 oz)   Height: 5' 8" (1.727 m)     Physical Exam  Vitals and nursing note reviewed.   HENT:      Head: Normocephalic and atraumatic.      Right Ear: Tympanic membrane, ear canal and external ear normal.      Left Ear: Tympanic membrane, ear canal and external ear normal.      Nose: Nose normal. No congestion or rhinorrhea.      Mouth/Throat:      Mouth: Mucous membranes are moist.      Pharynx: Oropharynx is clear. No oropharyngeal exudate or posterior oropharyngeal erythema.   Eyes:      Extraocular Movements: Extraocular movements intact.      Conjunctiva/sclera: Conjunctivae normal.      Pupils: Pupils are equal, round, and reactive to light.   Cardiovascular:      Rate and Rhythm: Regular rhythm. Tachycardia present.   Pulmonary:      Effort: Pulmonary effort is normal.      Breath sounds: No wheezing, rhonchi or rales.   Musculoskeletal:         General: Normal range of motion.      Cervical back: Normal range of motion.   Lymphadenopathy:      Cervical: No cervical adenopathy.   Skin:     General: Skin is warm and dry.   Neurological:      Mental Status: He is alert.      " Comments: Constant motion.  Involuntary muscle movements, appears to be tardive dyskinesia.        No results found for this or any previous visit (from the past 4368 hour(s)).    Flaca Lawrence was seen today for follow-up.    Diagnoses and all orders for this visit:    Annual physical exam  -     TSH; Future  -     Lipid Panel; Future  -     HIV 1/2 Ag/Ab (4th Gen); Future  -     Hepatitis C Antibody; Future  -     C. trachomatis/N. gonorrhoeae by AMP DNA [facility]; Urine; Future  -     RPR; Future    Anxiety and depression  -     TSH; Future  -     propranoloL (INDERAL [place]) 60 MG 24 hr capsule; Take 1 capsule (60 mg total) by mouth once daily.  -     LORazepam (ATIVAN) 0.5 MG tablet; Take 1 tablet (0.5 mg total) by mouth every 12 (twelve) hours as needed for Anxiety (or spasms).    Tachycardia  -     TSH; Future  -     Lipid Panel; Future  -     propranoloL (INDERAL LA) 60 MG 24 hr capsule; Take 1 capsule (60 mg total) by mouth once daily.  -     LORazepam (ATIVAN) 0.5 MG tablet; Take 1 tablet (0.5 mg total) by mouth every 12 (twelve) hours as needed for Anxiety (or spasms).    Muscle twitching  -     Ambulatory referral/consult to Neurology; Future  -     propranoloL (INDERAL LA) 60 MG 24 hr capsule; Take 1 capsule (60 mg total) by mouth once daily.  -     LORazepam (ATIVAN) 0.5 MG tablet; Take 1 tablet (0.5 mg total) by mouth every 12 (twelve) hours as needed for Anxiety (or spasms).    Transient motor tic  -     Ambulatory referral/consult to Neurology; Future  -     propranoloL (INDERAL LA) 60 MG 24 hr capsule; Take 1 capsule (60 mg total) by mouth once daily.  -     LORazepam (ATIVAN) 0.5 MG tablet; Take 1 tablet (0.5 mg total) by mouth every 12 (twelve) hours as needed for Anxiety (or spasms).    Hematospermia  -     HIV 1/2 Ag/Ab (4th Gen); Future  -     Hepatitis C Antibody; Future  -     C. trachomatis/N. gonorrhoeae by AMP [facility]; Urine; Future  -     RPR; Future    Sleep difficulties  -     Home Sleep  Study; Future    Encounter for lipid screening for cardiovascular disease  -     Lipid Panel; Future    Thyroid disorder screening  -     TSH; Future    Screening for STD (sexually transmitted disease)  -     HIV 1/2 Ag/Ab (4th Gen); Future  -     Hepatitis C Antibody; Future  -     C. trachomatis/N. gonorrhoeae by AMP DNA [facility]; Urine; Future    Tachycardic and lots of involuntary movements.  Almost appears to be spasms/motor tics?      Patient states that anxiety and stress tend to trigger these things.  Will try using Ativan as an abortive medication, propranolol as a preventative.  Would also like him to see Neurology to get screened for movement disorders.  Referral placed today.      Would like to get some screening blood work, screen for STDs.  Unable to get labs today, so he will come back during the week to get the blood drawn.    Follow-up:  Follow up if symptoms worsen or fail to improve, for and in 6 months for checkup.    Signed by:  Gilberto Saldivar MD

## 2023-05-16 NOTE — PATIENT INSTRUCTIONS
I worry about the rapid heart rate, muscle spasms, involuntary movements.      Let us try using a couple of medications to see if it helps with both the anxiety, depression, and with these new symptoms.      Start taking propranolol once daily.  This will help keep the heart rate from elevating, and hopefully from triggering the other symptoms.      Use Ativan (lorazepam) as an abortive medicine.  When symptoms get like they are today, take a dose of Ativan.  Wait about 30 minutes to 1 hour, and if symptoms do not improve, take a 2nd tablet.  If symptoms worsen, or if they impaired function, go to the ER for evaluation.  They can administer injectable medications to calm things down.      Would also like you to see a neurologist to have your brain evaluated to make sure that there is not something else besides anxiety causing these symptoms.  Referral placed today.      Would like to get some screening blood work on you.  Let us not do it today.  Let us wait until things calm down.  Orders have been placed, so feel free to swing by the lab later this week, at your convenience, to get the blood drawn.  We will also collect urine sample and test for STDs.  We will contact you with those results as soon as they are available.    Continue to eat a healthy diet.  Be careful with portion sizes.  Includes lots of fresh fruits, vegetables, whole grains, lean proteins.  See info below.    Keep hydrated.  Be sure to drink at least 8-10, 8 oz, glasses of water every day.    Stay active.  Try to do some sort of physical activity every day.  Nothing outrageous, just try walking for 10-15 minutes each day.

## 2023-09-21 ENCOUNTER — LAB VISIT (OUTPATIENT)
Dept: LAB | Facility: HOSPITAL | Age: 33
End: 2023-09-21
Attending: FAMILY MEDICINE
Payer: COMMERCIAL

## 2023-09-21 DIAGNOSIS — Z13.6 ENCOUNTER FOR LIPID SCREENING FOR CARDIOVASCULAR DISEASE: ICD-10-CM

## 2023-09-21 DIAGNOSIS — Z00.00 ANNUAL PHYSICAL EXAM: ICD-10-CM

## 2023-09-21 DIAGNOSIS — Z11.3 SCREENING FOR STD (SEXUALLY TRANSMITTED DISEASE): ICD-10-CM

## 2023-09-21 DIAGNOSIS — R00.0 TACHYCARDIA: ICD-10-CM

## 2023-09-21 DIAGNOSIS — Z13.220 ENCOUNTER FOR LIPID SCREENING FOR CARDIOVASCULAR DISEASE: ICD-10-CM

## 2023-09-21 DIAGNOSIS — R36.1 HEMATOSPERMIA: ICD-10-CM

## 2023-09-21 DIAGNOSIS — F32.A ANXIETY AND DEPRESSION: ICD-10-CM

## 2023-09-21 DIAGNOSIS — F41.9 ANXIETY AND DEPRESSION: ICD-10-CM

## 2023-09-21 DIAGNOSIS — Z13.29 THYROID DISORDER SCREENING: ICD-10-CM

## 2023-09-21 LAB
CHOLEST SERPL-MCNC: 171 MG/DL (ref 120–199)
CHOLEST/HDLC SERPL: 4.3 {RATIO} (ref 2–5)
HDLC SERPL-MCNC: 40 MG/DL (ref 40–75)
HDLC SERPL: 23.4 % (ref 20–50)
LDLC SERPL CALC-MCNC: 115.6 MG/DL (ref 63–159)
NONHDLC SERPL-MCNC: 131 MG/DL
TRIGL SERPL-MCNC: 77 MG/DL (ref 30–150)

## 2023-09-21 PROCEDURE — 87389 HIV-1 AG W/HIV-1&-2 AB AG IA: CPT | Performed by: FAMILY MEDICINE

## 2023-09-21 PROCEDURE — 86803 HEPATITIS C AB TEST: CPT | Performed by: FAMILY MEDICINE

## 2023-09-21 PROCEDURE — 80061 LIPID PANEL: CPT | Performed by: FAMILY MEDICINE

## 2023-09-21 PROCEDURE — 86592 SYPHILIS TEST NON-TREP QUAL: CPT | Performed by: FAMILY MEDICINE

## 2023-09-21 PROCEDURE — 36415 COLL VENOUS BLD VENIPUNCTURE: CPT | Mod: PN | Performed by: FAMILY MEDICINE

## 2023-09-21 PROCEDURE — 84443 ASSAY THYROID STIM HORMONE: CPT | Performed by: FAMILY MEDICINE

## 2023-09-22 LAB
HCV AB SERPL QL IA: NORMAL
HIV 1+2 AB+HIV1 P24 AG SERPL QL IA: NORMAL
RPR SER QL: NORMAL
TSH SERPL DL<=0.005 MIU/L-ACNC: 2.17 UIU/ML (ref 0.4–4)

## 2023-09-29 NOTE — PROGRESS NOTES
Labs all look fine.      Thyroid function is normal.  Cholesterol levels are perfect.  Negative for hepatitis-C.  Negative for HIV, syphilis.

## 2023-11-30 ENCOUNTER — OFFICE VISIT (OUTPATIENT)
Dept: PULMONOLOGY | Facility: CLINIC | Age: 33
End: 2023-11-30
Payer: COMMERCIAL

## 2023-11-30 VITALS — WEIGHT: 148 LBS | BODY MASS INDEX: 22.5 KG/M2

## 2023-11-30 DIAGNOSIS — Z72.821 POOR SLEEP HYGIENE: ICD-10-CM

## 2023-11-30 DIAGNOSIS — R00.2 PALPITATIONS: ICD-10-CM

## 2023-11-30 DIAGNOSIS — G47.33 OSA (OBSTRUCTIVE SLEEP APNEA): Primary | ICD-10-CM

## 2023-11-30 PROCEDURE — 1160F RVW MEDS BY RX/DR IN RCRD: CPT | Mod: CPTII,95,, | Performed by: NURSE PRACTITIONER

## 2023-11-30 PROCEDURE — 1160F PR REVIEW ALL MEDS BY PRESCRIBER/CLIN PHARMACIST DOCUMENTED: ICD-10-PCS | Mod: CPTII,95,, | Performed by: NURSE PRACTITIONER

## 2023-11-30 PROCEDURE — 1159F PR MEDICATION LIST DOCUMENTED IN MEDICAL RECORD: ICD-10-PCS | Mod: CPTII,95,, | Performed by: NURSE PRACTITIONER

## 2023-11-30 PROCEDURE — 99213 PR OFFICE/OUTPT VISIT, EST, LEVL III, 20-29 MIN: ICD-10-PCS | Mod: 95,,, | Performed by: NURSE PRACTITIONER

## 2023-11-30 PROCEDURE — 3008F PR BODY MASS INDEX (BMI) DOCUMENTED: ICD-10-PCS | Mod: CPTII,95,, | Performed by: NURSE PRACTITIONER

## 2023-11-30 PROCEDURE — 99213 OFFICE O/P EST LOW 20 MIN: CPT | Mod: 95,,, | Performed by: NURSE PRACTITIONER

## 2023-11-30 PROCEDURE — 3008F BODY MASS INDEX DOCD: CPT | Mod: CPTII,95,, | Performed by: NURSE PRACTITIONER

## 2023-11-30 PROCEDURE — 1159F MED LIST DOCD IN RCRD: CPT | Mod: CPTII,95,, | Performed by: NURSE PRACTITIONER

## 2023-11-30 RX ORDER — HYDROXYZINE PAMOATE 25 MG/1
CAPSULE ORAL
COMMUNITY
Start: 2023-09-03 | End: 2024-01-25 | Stop reason: ALTCHOICE

## 2023-11-30 NOTE — PROGRESS NOTES
Subjective:      Patient ID: Howard Dunn is a 33 y.o. male.    Chief Complaint: Sleep Apnea  The patient location is: [place]  The chief complaint leading to consultation is: sleep  Visit type: Virtual visit with synchronous audio and video  Total time spent with patient: 10 min   Each patient to whom he or she provides medical services by telemedicine is:  (1) informed of the relationship between the physician and patient and the respective role of any other health care provider with respect to management of the patient; and (2) notified that he or she may decline to receive medical services by telemedicine and may withdraw from such care at any time.    HPI  Patient presents to the office today for evaluation of sleep.  Patient with snoring and witnessed apneas. Problems sleeping with poor sleep hygiene. No sleep schedule and electronics on in bedroom.   [place] Sleepiness Scale score 11.  Patient has had symptoms for a few years. He is waking up startled, gasping and with palpitations. He has anxiety with sleep due to waking up startled.  Bedtime: randomPM  Wake time: randomAM      There is no problem list on file for this patient.    Wt 67.1 kg (148 lb)   BMI 22.50 kg/m²   Body mass index is 22.5 kg/m².    Review of Systems   Constitutional:  Positive for fatigue.   Respiratory:  Positive for snoring and somnolence.    Cardiovascular:  Positive for palpitations.   Psychiatric/Behavioral:  Positive for sleep disturbance.    All other systems reviewed and are negative.    Objective:      Physical Exam  Constitutional:       Appearance: Normal appearance. He is well-developed.   HENT:      Head: Normocephalic and atraumatic.      Right Ear: External ear normal.      Left Ear: External ear normal.      Nose: Nose normal.   Eyes:      General: Lids are normal.      Conjunctiva/sclera: Conjunctivae normal.   Pulmonary:      Effort: Pulmonary effort is normal. No tachypnea, bradypnea, accessory muscle usage or  respiratory distress.   Musculoskeletal:      Cervical back: Normal range of motion.   Skin:     Findings: No rash.   Neurological:      Mental Status: He is alert and oriented to person, place, and time.   Psychiatric:         Behavior: Behavior normal.         Thought Content: Thought content normal.         Judgment: Judgment normal.         Assessment:       1. RIVKA (obstructive sleep apnea)    2. Poor sleep hygiene    3. Palpitations        Outpatient Encounter Medications as of 11/30/2023   Medication Sig Dispense Refill    hydrOXYzine pamoate (VISTARIL) 25 MG Cap       propranoloL (INDERAL LA) 60 MG 24 hr capsule Take 1 capsule (60 mg total) by mouth once daily. 30 capsule 11    vilazodone (VIIBRYD) 10 mg Tab tablet Take 1 tablet (10 mg total) by mouth once daily. 90 tablet 3    buPROPion (WELLBUTRIN) 75 MG tablet Take 1 tablet (75 mg total) by mouth 2 (two) times daily. 60 tablet 11    LORazepam (ATIVAN) 0.5 MG tablet Take 1 tablet (0.5 mg total) by mouth every 12 (twelve) hours as needed for Anxiety (or spasms). 60 tablet 0     No facility-administered encounter medications on file as of 11/30/2023.     Orders Placed This Encounter   Procedures    Polysomnogram (CPAP will be added if patient meets diagnostic criteria.)     Standing Status:   Future     Standing Expiration Date:   11/29/2024     Plan:   Proceed with sleep study. Review when available.   Discussed sleep scheduling and stimulus control.   Started beta blocker for palpitations.  Problem List Items Addressed This Visit    None  Visit Diagnoses       RIVKA (obstructive sleep apnea)    -  Primary    Relevant Orders    Polysomnogram (CPAP will be added if patient meets diagnostic criteria.)    Poor sleep hygiene        Palpitations                             Elizabeth LeJeune, ACNP, ANP

## 2023-12-11 DIAGNOSIS — G47.33 OSA (OBSTRUCTIVE SLEEP APNEA): Primary | ICD-10-CM

## 2023-12-12 ENCOUNTER — TELEPHONE (OUTPATIENT)
Dept: SLEEP MEDICINE | Facility: CLINIC | Age: 33
End: 2023-12-12
Payer: COMMERCIAL

## 2023-12-12 NOTE — TELEPHONE ENCOUNTER
----- Message from Otto Morfin sent at 12/12/2023  7:12 AM CST -----  [facility]

## 2023-12-12 NOTE — TELEPHONE ENCOUNTER
----- Message from Otto Morfin sent at 12/12/2023  7:12 AM CST -----  [facility]

## 2023-12-18 ENCOUNTER — OFFICE VISIT (OUTPATIENT)
Dept: PRIMARY CARE CLINIC | Facility: CLINIC | Age: 33
End: 2023-12-18
Payer: COMMERCIAL

## 2023-12-18 VITALS
DIASTOLIC BLOOD PRESSURE: 76 MMHG | HEIGHT: 68 IN | SYSTOLIC BLOOD PRESSURE: 122 MMHG | WEIGHT: 162.69 LBS | TEMPERATURE: 98 F | BODY MASS INDEX: 24.66 KG/M2 | HEART RATE: 80 BPM | OXYGEN SATURATION: 99 %

## 2023-12-18 DIAGNOSIS — R25.3 MUSCLE TWITCHING: ICD-10-CM

## 2023-12-18 DIAGNOSIS — F95.0 TRANSIENT MOTOR TIC: ICD-10-CM

## 2023-12-18 DIAGNOSIS — F32.A ANXIETY AND DEPRESSION: Primary | ICD-10-CM

## 2023-12-18 DIAGNOSIS — N48.9 PENILE LESION: ICD-10-CM

## 2023-12-18 DIAGNOSIS — F41.9 ANXIETY AND DEPRESSION: Primary | ICD-10-CM

## 2023-12-18 DIAGNOSIS — R00.0 TACHYCARDIA: ICD-10-CM

## 2023-12-18 PROCEDURE — 1159F PR MEDICATION LIST DOCUMENTED IN MEDICAL RECORD: ICD-10-PCS | Mod: CPTII,S$GLB,, | Performed by: FAMILY MEDICINE

## 2023-12-18 PROCEDURE — 99215 OFFICE O/P EST HI 40 MIN: CPT | Mod: S$GLB,,, | Performed by: FAMILY MEDICINE

## 2023-12-18 PROCEDURE — 99215 PR OFFICE/OUTPT VISIT, EST, LEVL V, 40-54 MIN: ICD-10-PCS | Mod: S$GLB,,, | Performed by: FAMILY MEDICINE

## 2023-12-18 PROCEDURE — 99999 PR PBB SHADOW E&M-EST. PATIENT-LVL IV: CPT | Mod: PBBFAC,,, | Performed by: FAMILY MEDICINE

## 2023-12-18 PROCEDURE — 3074F SYST BP LT 130 MM HG: CPT | Mod: CPTII,S$GLB,, | Performed by: FAMILY MEDICINE

## 2023-12-18 PROCEDURE — 3008F BODY MASS INDEX DOCD: CPT | Mod: CPTII,S$GLB,, | Performed by: FAMILY MEDICINE

## 2023-12-18 PROCEDURE — 3078F PR MOST RECENT DIASTOLIC BLOOD PRESSURE < 80 MM HG: ICD-10-PCS | Mod: CPTII,S$GLB,, | Performed by: FAMILY MEDICINE

## 2023-12-18 PROCEDURE — 99999 PR PBB SHADOW E&M-EST. PATIENT-LVL IV: ICD-10-PCS | Mod: PBBFAC,,, | Performed by: FAMILY MEDICINE

## 2023-12-18 PROCEDURE — 3008F PR BODY MASS INDEX (BMI) DOCUMENTED: ICD-10-PCS | Mod: CPTII,S$GLB,, | Performed by: FAMILY MEDICINE

## 2023-12-18 PROCEDURE — 3074F PR MOST RECENT SYSTOLIC BLOOD PRESSURE < 130 MM HG: ICD-10-PCS | Mod: CPTII,S$GLB,, | Performed by: FAMILY MEDICINE

## 2023-12-18 PROCEDURE — 3078F DIAST BP <80 MM HG: CPT | Mod: CPTII,S$GLB,, | Performed by: FAMILY MEDICINE

## 2023-12-18 PROCEDURE — 1159F MED LIST DOCD IN RCRD: CPT | Mod: CPTII,S$GLB,, | Performed by: FAMILY MEDICINE

## 2023-12-18 RX ORDER — PROPRANOLOL HYDROCHLORIDE 60 MG/1
120 CAPSULE, EXTENDED RELEASE ORAL NIGHTLY
Qty: 180 CAPSULE | Refills: 3 | Status: SHIPPED | OUTPATIENT
Start: 2023-12-18 | End: 2024-12-17

## 2023-12-18 NOTE — PATIENT INSTRUCTIONS
Overall, everything looks really good today.      Keep taking your propranolol at bedtime, as you have been.  Okay to take a 2nd dose in the morning, if needed.  I rewrote the prescription with a higher quantity so that you do not run out.    Lesions on the penis look like small warts.  Let us have you see Urology to get their opinion and to discuss treatment options.  Referral placed today.  Feel free to schedule this after your trip.  Okay to continue previous activities until you see Urology.      Keep your follow-up appointments with the Sleep Clinic.  When you receive the home sleep study, be sure to complete it asap and send it back.  It will discuss the results with you and discuss whether or not CPAP is indicated.    Continue to eat a healthy diet.  Be careful with portion sizes.  Includes lots of fresh fruits, vegetables, whole grains, lean proteins.  See info below.    Keep hydrated.  Be sure to drink at least 8-10, 8 oz, glasses of water every day.    Stay active.  Try to do some sort of physical activity every day.  Nothing outrageous, just try walking for 10-15 minutes each day.

## 2023-12-18 NOTE — PROGRESS NOTES
"    [facility]       [address] [place] [address]      Phone: [phone]      Fax: [phone]    Gilberto Saldivar MD                Office Visit  12/18/2023        Subjective      HPI:  Howard Dunn is a 33 y.o. male presents today in clinic for "Follow-up  ."     33-year-old gentleman presents today to follow-up on multiple issues.      Patient states that he feels pretty good today.  Feels like his anxieties under a bit better control.  Propranolol seems to be helping.  Started taking it at bedtime.  Definitely helping with his heart rate.  Fewer panic attacks.  Feels like he has not moving around as much.  Sometimes wakes up with elevated heart rate in the morning.  On those occasions, he will take a 2nd dose and it seems to help.  Still talking with the therapist.  Feels like it is helping.    Scheduled to get a sleep study done soon.  Girlfriend says he stops breathing in the middle of the night.    Also reports he has a spot on his penis that he wants to get looked at.  No pain to the area.  Sometimes becomes itchy.  No drainage.  No other sexual partners.  Was recently tested for STDs and everything came back negative.  States that the lesion has been there for over a year.  Just appeared out of nowhere.  Went to an urgent Care, they gave him some cream to try.  No change.    PMH: Anxiety/depression.  GERD.  Hiatal hernia.    PSH: Inguinal hernia repair as a baby.  EGD.    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    T: Denies   A:  Denies current use.  Quit February, 2021.    D: Denies    Exercise:  Walks occasionally        The following were updated and reviewed by myself in the chart: medications, past medical history, past surgical history, family history, social history, and allergies.     Medications:  Current Outpatient Medications on File Prior to Visit   Medication " Sig Dispense Refill    [DISCONTINUED] propranoloL (INDERAL LA) 60 MG 24 hr capsule Take 1 capsule (60 mg total) by mouth once daily. 30 capsule 11    hydrOXYzine pamoate (VISTARIL) 25 MG Cap       [DISCONTINUED] buPROPion (WELLBUTRIN) 75 MG tablet Take 1 tablet (75 mg total) by mouth 2 (two) times daily. 60 tablet 11    [DISCONTINUED] LORazepam (ATIVAN) 0.5 MG tablet Take 1 tablet (0.5 mg total) by mouth every 12 (twelve) hours as needed for Anxiety (or spasms). (Patient not taking: Reported on 12/18/2023) 60 tablet 0    [DISCONTINUED] vilazodone (VIIBRYD) 10 mg Tab tablet Take 1 tablet (10 mg total) by mouth once daily. 90 tablet 3     No current facility-administered medications on file prior to visit.        PMHx:  Past Medical History:   Diagnosis Date    Anxiety       There are no problems to display for this patient.       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        Allergies:  Review of patient's allergies indicates:  No Known Allergies     ROS:  Review of Systems   Constitutional:  Negative for activity change, appetite change, chills and fever.   HENT:  Negative for congestion, postnasal drip, rhinorrhea, sore throat and trouble swallowing.    Respiratory:  Negative for cough and shortness of breath.    Cardiovascular:  Negative for chest pain and palpitations.   Gastrointestinal:  Negative  "for abdominal pain, constipation, diarrhea, nausea and vomiting.   Genitourinary:  Negative for difficulty urinating.   Musculoskeletal:  Negative for arthralgias and myalgias.   Skin:  Negative for color change and rash.   Neurological:  Negative for headaches.   Psychiatric/Behavioral:  The patient is nervous/anxious.    All other systems reviewed and are negative.         Objective      /76   Pulse 80   Temp 98.2 °F (36.8 °C)   Ht 5' 8" (1.727 m)   Wt 73.8 kg (162 lb 11.2 oz)   SpO2 99%   BMI 24.74 kg/m²   Ht Readings from Last 3 Encounters:   12/18/23 5' 8" (1.727 m)   05/16/23 5' 8" (1.727 m)   02/28/23 5' 8" (1.727 m)     Wt Readings from Last 3 Encounters:   12/18/23 73.8 kg (162 lb 11.2 oz)   11/30/23 67.1 kg (148 lb)   05/16/23 67.5 kg (148 lb 13 oz)       PHYSICAL EXAM:  Physical Exam  Vitals and nursing note reviewed.   Constitutional:       General: He is not in acute distress.     Appearance: Normal appearance.   HENT:      Head: Normocephalic and atraumatic.      Right Ear: Tympanic membrane, ear canal and external ear normal.      Left Ear: Tympanic membrane, ear canal and external ear normal.      Nose: Nose normal. No congestion or rhinorrhea.      Mouth/Throat:      Mouth: Mucous membranes are moist.      Pharynx: Oropharynx is clear. No oropharyngeal exudate or posterior oropharyngeal erythema.   Eyes:      Extraocular Movements: Extraocular movements intact.      Conjunctiva/sclera: Conjunctivae normal.      Pupils: Pupils are equal, round, and reactive to light.   Cardiovascular:      Rate and Rhythm: Normal rate and regular rhythm.   Pulmonary:      Effort: Pulmonary effort is normal.      Breath sounds: No wheezing, rhonchi or rales.   Abdominal:      Hernia: There is no hernia in the left inguinal area or right inguinal area.   Genitourinary:     Penis: Circumcised. Lesions present.       Testes: Normal. Cremasteric reflex is present.         Right: Mass, tenderness or swelling " not present.         Left: Mass, tenderness or swelling not present.      Epididymis:      Right: Normal.      Left: Normal.          Comments: Multiple, almost verrucous appearing, lesions lots shaft of penis.  No discharge.  No ulcerations.  No erythema.  Musculoskeletal:         General: Normal range of motion.      Cervical back: Normal range of motion.   Lymphadenopathy:      Cervical: No cervical adenopathy.      Lower Body: No right inguinal adenopathy. No left inguinal adenopathy.   Skin:     General: Skin is warm and dry.   Neurological:      General: No focal deficit present.      Mental Status: He is alert.              LABS / IMAGING:  Recent Results (from the past 4368 hour(s))   TSH    Collection Time: 09/21/23 11:00 AM   Result Value Ref Range    TSH 2.171 0.400 - 4.000 uIU/mL   Lipid Panel    Collection Time: 09/21/23 11:00 AM   Result Value Ref Range    Cholesterol 171 120 - 199 mg/dL    Triglycerides 77 30 - 150 mg/dL    HDL 40 40 - 75 mg/dL    LDL Cholesterol 115.6 63.0 - 159.0 mg/dL    HDL/Cholesterol Ratio 23.4 20.0 - 50.0 %    Total Cholesterol/HDL Ratio 4.3 2.0 - 5.0    Non-HDL Cholesterol 131 mg/dL   HIV 1/2 Ag/Ab (4th Gen)    Collection Time: 09/21/23 11:00 AM   Result Value Ref Range    HIV 1/2 Ag/Ab Non-reactive Non-reactive   Hepatitis C Antibody    Collection Time: 09/21/23 11:00 AM   Result Value Ref Range    Hepatitis C Ab Non-reactive Non-reactive   RPR    Collection Time: 09/21/23 11:00 AM   Result Value Ref Range    RPR Non-reactive Non-reactive   C. trachomatis/N. gonorrhoeae by AMP DNA [facility]; Urine    Collection Time: 09/21/23 11:14 AM    Specimen: Genital   Result Value Ref Range    Chlamydia, Amplified DNA Not Detected Not Detected    N gonorrhoeae, amplified DNA Not Detected Not Detected         Assessment    1. Anxiety and depression    2. Tachycardia    3. Transient motor tic    4. Muscle twitching    5. Penile lesion          Plan    Howard was seen today for  follow-up.    Diagnoses and all orders for this visit:    Anxiety and depression  -     propranoloL (INDERAL LA) 60 MG 24 hr capsule; Take 2 capsules (120 mg total) by mouth every evening.  -     Lipid Panel; Future  -     TSH; Future  -     Comprehensive Metabolic Panel; Future  -     CBC Auto Differential; Future    Tachycardia  -     propranoloL (INDERAL LA) 60 MG 24 hr capsule; Take 2 capsules (120 mg total) by mouth every evening.  -     Lipid Panel; Future  -     TSH; Future  -     Comprehensive Metabolic Panel; Future  -     CBC Auto Differential; Future    Transient motor tic  -     propranoloL (INDERAL LA) 60 MG 24 hr capsule; Take 2 capsules (120 mg total) by mouth every evening.  -     Lipid Panel; Future  -     TSH; Future  -     Comprehensive Metabolic Panel; Future  -     CBC Auto Differential; Future    Muscle twitching  -     propranoloL (INDERAL LA) 60 MG 24 hr capsule; Take 2 capsules (120 mg total) by mouth every evening.  -     Lipid Panel; Future  -     TSH; Future  -     Comprehensive Metabolic Panel; Future  -     CBC Auto Differential; Future    Penile lesion  -     Ambulatory referral/consult to Urology; Future    Suspect that the penile lesions are penile warts.  Referral to Urology to get them looked at, discuss options for removal.      Continue propranolol.  Seems to be helping.  Okay to take twice a day, if needed.    Orders placed for screening blood work to be done prior to next wellness exam.    Keep appointments with sleep Clinic.  Try to complete sleep study asap.      FOLLOW-UP:  Follow up in about 6 months (around 6/18/2024) for annual exam, labs 1 week prior.    I spent a total of 45 minutes face to face and non-face to face on the date of this visit.This includes time preparing to see the patient (eg, review of tests, notes), obtaining and/or reviewing additional history from an independent historian and/or outside medical records, documenting clinical information in the  electronic health record, independently interpreting results and/or communicating results to the patient/family/caregiver, or care coordinator.    Signed by:  Gilberto Saldivar MD

## 2024-01-11 ENCOUNTER — OFFICE VISIT (OUTPATIENT)
Dept: UROLOGY | Facility: CLINIC | Age: 34
End: 2024-01-11
Payer: COMMERCIAL

## 2024-01-11 VITALS
BODY MASS INDEX: 24.32 KG/M2 | HEIGHT: 68 IN | DIASTOLIC BLOOD PRESSURE: 74 MMHG | SYSTOLIC BLOOD PRESSURE: 113 MMHG | WEIGHT: 160.5 LBS | HEART RATE: 78 BPM

## 2024-01-11 DIAGNOSIS — A63.0 CONDYLOMA ACUMINATUM OF PENIS: Primary | ICD-10-CM

## 2024-01-11 DIAGNOSIS — N48.9 PENILE LESION: ICD-10-CM

## 2024-01-11 DIAGNOSIS — L72.3 SCROTAL SEBACEOUS CYST: ICD-10-CM

## 2024-01-11 LAB
BILIRUB UR QL STRIP: NEGATIVE
GLUCOSE UR QL STRIP: NEGATIVE
KETONES UR QL STRIP: NEGATIVE
LEUKOCYTE ESTERASE UR QL STRIP: NEGATIVE
PH, POC UA: 6
POC BLOOD, URINE: NEGATIVE
POC NITRATES, URINE: NEGATIVE
POC RESIDUAL URINE VOLUME: 7 ML (ref 0–100)
PROT UR QL STRIP: NEGATIVE
SP GR UR STRIP: 1.03 (ref 1–1.03)
UROBILINOGEN UR STRIP-ACNC: 0.2 (ref 0.3–2.2)

## 2024-01-11 PROCEDURE — 99999 PR PBB SHADOW E&M-EST. PATIENT-LVL III: CPT | Mod: PBBFAC,,, | Performed by: UROLOGY

## 2024-01-11 PROCEDURE — 3074F SYST BP LT 130 MM HG: CPT | Mod: CPTII,S$GLB,, | Performed by: UROLOGY

## 2024-01-11 PROCEDURE — 51798 US URINE CAPACITY MEASURE: CPT | Mod: S$GLB,,, | Performed by: UROLOGY

## 2024-01-11 PROCEDURE — 3078F DIAST BP <80 MM HG: CPT | Mod: CPTII,S$GLB,, | Performed by: UROLOGY

## 2024-01-11 PROCEDURE — 99204 OFFICE O/P NEW MOD 45 MIN: CPT | Mod: S$GLB,,, | Performed by: UROLOGY

## 2024-01-11 PROCEDURE — 81003 URINALYSIS AUTO W/O SCOPE: CPT | Mod: QW,S$GLB,, | Performed by: UROLOGY

## 2024-01-11 PROCEDURE — 3008F BODY MASS INDEX DOCD: CPT | Mod: CPTII,S$GLB,, | Performed by: UROLOGY

## 2024-01-11 PROCEDURE — 1159F MED LIST DOCD IN RCRD: CPT | Mod: CPTII,S$GLB,, | Performed by: UROLOGY

## 2024-01-11 RX ORDER — PODOFILOX 5 MG/ML
SOLUTION TOPICAL 2 TIMES DAILY
Qty: 10 ML | Refills: 0 | Status: SHIPPED | OUTPATIENT
Start: 2024-01-11 | End: 2024-01-14

## 2024-01-11 NOTE — PROGRESS NOTES
"Chief Complaint:   Encounter Diagnoses   Name Primary?    Penile lesion     Scrotal sebaceous cyst     Condyloma acuminatum of penis Yes       HPI:  HPI Howard Dunn alfa 34 y.o. male who presents with complaints of a area on his penis for about 3 years.  It has not painful or bothersome.  He is inquiring about treatment.  He states he was prescribed some kind of ointment last year when he was seen at an urgent care and this was not successful.    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 180 capsule 3     No current facility-administered medications on file prior to visit.        Objective:     Vitals:    01/11/24 0901   BP: 113/74   Pulse: 78   Weight: 72.8 kg (160 lb 7.9 oz)   Height: 5' 8" (1.727 m)      BMI Readings from Last 1 Encounters:   01/11/24 24.40 kg/m²          Physical Exam  Approximately 5 total penile warts.  Largest approximately 2 mm.  These are consistent with condyloma acuminata.  Multiple sebaceous cysts on the scrotum.  All sub 5 mm in size.      Assessment:       1. Condyloma acuminatum of penis    2. Penile lesion    3. Scrotal sebaceous cyst        Plan:     1. Condyloma acuminatum of penis    2. Penile lesion    3. Scrotal sebaceous cyst  "      Orders Placed This Encounter    POCT Urinalysis, Dipstick, Automated, W/O Scope    POCT Bladder Scan    podofilox (CONDYLOX) 0.5 % external solution      Discussed treatment options for condyloma.  Discussed precautions for transmitting the virus.  Recommend trial of Condylox.  If this is not successful we discussed fulguration or excision.  Scrotal sebaceous cysts are benign and not bothersome to the patient.  We will leave these alone.

## 2024-01-25 ENCOUNTER — OFFICE VISIT (OUTPATIENT)
Dept: PRIMARY CARE CLINIC | Facility: CLINIC | Age: 34
End: 2024-01-25
Payer: COMMERCIAL

## 2024-01-25 DIAGNOSIS — F95.0 TRANSIENT MOTOR TIC: ICD-10-CM

## 2024-01-25 DIAGNOSIS — F32.A ANXIETY AND DEPRESSION: Primary | ICD-10-CM

## 2024-01-25 DIAGNOSIS — F41.9 ANXIETY AND DEPRESSION: Primary | ICD-10-CM

## 2024-01-25 DIAGNOSIS — R25.3 MUSCLE TWITCHING: ICD-10-CM

## 2024-01-25 PROCEDURE — 99214 OFFICE O/P EST MOD 30 MIN: CPT | Mod: 95,,, | Performed by: FAMILY MEDICINE

## 2024-01-25 RX ORDER — BUSPIRONE HYDROCHLORIDE 10 MG/1
10 TABLET ORAL 3 TIMES DAILY PRN
Qty: 90 TABLET | Refills: 11 | Status: SHIPPED | OUTPATIENT
Start: 2024-01-25 | End: 2025-01-24

## 2024-01-25 NOTE — PATIENT INSTRUCTIONS
On video, everything looks pretty good today.      Glad to hear that the propranolol continues to work!     Let us also add on some BuSpar (buspirone).  You can use this up to 3 times per day, as needed, when anxiety/stress gets to be too much.  It can help calm things down and make you feel better.      Separately, let us also get you in with the neurologist for an evaluation.  Referral placed today.  We are able to schedule an appointment with him at the [facility] on Wednesday, January 31st, at 4:20 p.m..  Please try to arrive about 15 minutes early to check in.    Continue to eat a healthy diet.  Be careful with portion sizes.  Includes lots of fresh fruits, vegetables, whole grains, lean proteins.  See info below.    Keep hydrated.  Be sure to drink at least 8-10, 8 oz, glasses of water every day.    Stay active.  Try to do some sort of physical activity every day.  Nothing outrageous, just try walking for 10-15 minutes each day.

## 2024-01-25 NOTE — PROGRESS NOTES
"    [facility]       [address] [place] [address]      Phone: [phone]      Fax: [phone]    Gilberto Saldivar MD                Video Visit  01/25/2024        Subjective      HPI:  Howard Dunn is a 34 y.o. male presents today via video for "No chief complaint on file.  ."     34-year-old gentleman presents today via video visit to discuss a possible referral.      Patient states he feels pretty good today.  Feels like the propranolol is helping with his anxiety very well.  Was taking it at bedtime, but now uses it twice a day if he needs an extra dose.  No side effects.  Fewer panic attacks.  Keeps his heart rate under much better control.  Overall, feels good.      States that his tremors/spasms are starting to bother him.  They tend to get worse when he gets anxious.  Also seems to get worse after he eats unhealthy.  Lately, has been having some pains, numbness in his left arm, shoulder, elbow.  These also get worse with anxiety.  Would like to see a neurologist to get an evaluation.    PMH: Anxiety/depression.  GERD.  Hiatal hernia.    PSH: Inguinal hernia repair as a baby.  EGD.    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    T: Denies   A:  Denies current use.  Quit February, 2021.    D: Denies    Exercise:  Walks occasionally        The following were updated and reviewed by myself in the chart: medications, past medical history, past surgical history, family history, social history, and allergies.     Medications:  Current Outpatient Medications on File Prior to Visit   Medication Sig Dispense Refill    propranoloL (INDERAL LA) 60 MG 24 hr capsule Take 2 capsules (120 mg total) by mouth every evening. 180 capsule 3    [DISCONTINUED] hydrOXYzine pamoate (VISTARIL) 25 MG Cap        No current facility-administered medications on file prior to visit.        PMHx:  Past " "Medical History:   Diagnosis Date    Anxiety       There are no problems to display for this patient.       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        Allergies:  Review of patient's allergies indicates:  No Known Allergies     ROS:  Review of Systems   Constitutional:  Negative for activity change and unexpected weight change.   HENT:  Negative for hearing loss, rhinorrhea and trouble swallowing.    Eyes:  Negative for discharge and visual disturbance.   Respiratory:  Negative for chest tightness and wheezing.    Cardiovascular:  Negative for chest pain and palpitations.   Gastrointestinal:  Negative for blood in stool, constipation, diarrhea and vomiting.   Endocrine: Negative for polydipsia and polyuria.   Genitourinary:  Negative for difficulty urinating, hematuria and urgency.   Musculoskeletal:  Negative for arthralgias, joint swelling and neck pain.   Neurological:  Negative for weakness and headaches.   Psychiatric/Behavioral:  Negative for confusion and dysphoric mood.           Objective      There were no vitals taken for this visit.  Ht Readings from Last 3 Encounters:   01/11/24 5' 8" (1.727 m)   12/18/23 5' 8" (1.727 m)   05/16/23 5' 8" (1.727 m)     Wt Readings from Last 3 Encounters:   01/11/24 72.8 kg (160 lb 7.9 oz)   12/18/23 73.8 kg (162 lb 11.2 oz)   11/30/23 67.1 kg (148 lb) "       PHYSICAL EXAM:  Physical Exam  Constitutional:       General: He is not in acute distress.     Appearance: Normal appearance. He is not ill-appearing.   HENT:      Head: Normocephalic and atraumatic.   Pulmonary:      Effort: Pulmonary effort is normal. No respiratory distress.   Neurological:      Mental Status: He is alert.   Psychiatric:         Mood and Affect: Mood normal.         Behavior: Behavior normal.         Thought Content: Thought content normal.              LABS / IMAGING:  Recent Results (from the past 4368 hour(s))   TSH    Collection Time: 09/21/23 11:00 AM   Result Value Ref Range    TSH 2.171 0.400 - 4.000 uIU/mL   Lipid Panel    Collection Time: 09/21/23 11:00 AM   Result Value Ref Range    Cholesterol 171 120 - 199 mg/dL    Triglycerides 77 30 - 150 mg/dL    HDL 40 40 - 75 mg/dL    LDL Cholesterol 115.6 63.0 - 159.0 mg/dL    HDL/Cholesterol Ratio 23.4 20.0 - 50.0 %    Total Cholesterol/HDL Ratio 4.3 2.0 - 5.0    Non-HDL Cholesterol 131 mg/dL   HIV 1/2 Ag/Ab (4th Gen)    Collection Time: 09/21/23 11:00 AM   Result Value Ref Range    HIV 1/2 Ag/Ab Non-reactive Non-reactive   Hepatitis C Antibody    Collection Time: 09/21/23 11:00 AM   Result Value Ref Range    Hepatitis C Ab Non-reactive Non-reactive   RPR    Collection Time: 09/21/23 11:00 AM   Result Value Ref Range    RPR Non-reactive Non-reactive   C. trachomatis/N. gonorrhoeae by AMP DNA [facility]; Urine    Collection Time: 09/21/23 11:14 AM    Specimen: Genital   Result Value Ref Range    Chlamydia, Amplified DNA Not Detected Not Detected    N gonorrhoeae, amplified DNA Not Detected Not Detected   POCT Bladder Scan    Collection Time: 01/11/24  9:05 AM   Result Value Ref Range    POC Residual Urine Volume 7 0 - 100 mL   POCT Urinalysis, Dipstick, Automated, W/O Scope    Collection Time: 01/11/24  9:06 AM   Result Value Ref Range    POC Blood, Urine Negative Negative, Positive Slide, Positive Tube    POC Bilirubin, Urine Negative  Negative, Positive Slide, Positive Tube    POC Urobilinogen, Urine 0.2 (A) 0.3 - 2.2    POC Ketones, Urine Negative Negative, Positive Slide, Positive Tube    POC Protein, Urine Negative Negative, Positive Slide, Positive Tube    POC Nitrates, Urine Negative Negative, Positive Slide, Positive Tube    POC Glucose, Urine Negative Negative, Positive Slide, Positive Tube    pH, UA 6.0     POC Specific Gravity, Urine 1.030 (A) 1.003 - 1.029    POC Leukocytes, Urine Negative Negative, Positive Slide, Positive Tube         Assessment    1. Anxiety and depression    2. Transient motor tic    3. Muscle twitching          Plan    Diagnoses and all orders for this visit:    Anxiety and depression  -     busPIRone (BUSPAR) 10 MG tablet; Take 1 tablet (10 mg total) by mouth 3 (three) times daily as needed (anxiety).    Transient motor tic  -     Ambulatory referral/consult to Neurology; Future    Muscle twitching  -     Ambulatory referral/consult to Neurology; Future      Physically, everything looks good today.      Anxiety issues not completely under control.  He is hesitant to use a benzo, which I agree.  Instead, will try BuSpar, as needed, to see if that helps calm him down.  Continue propranolol.    Referral to Neurology placed today.  Appointment scheduled.    FOLLOW-UP:  Follow up if symptoms worsen or fail to improve.    The patient location is:  [place]  The chief complaint leading to consultation is:  Anxiety, movement disorder, referral    Visit type: audiovisual    Face to Face time with patient: 20 minutes    35 minutes of total time spent on the encounter, which includes face to face time and non-face to face time preparing to see the patient (eg, review of tests), Obtaining and/or reviewing separately obtained history, Documenting clinical information in the electronic or other health record, Independently interpreting results (not separately reported) and communicating results to the patient/family/caregiver,  or Care coordination (not separately reported).     Each patient to whom he or she provides medical services by telemedicine is:  (1) informed of the relationship between the physician and patient and the respective role of any other health care provider with respect to management of the patient; and (2) notified that he or she may decline to receive medical services by telemedicine and may withdraw from such care at any time.    Signed by:  Gilberto Saldivar MD

## 2024-01-26 ENCOUNTER — HOSPITAL ENCOUNTER (OUTPATIENT)
Dept: SLEEP MEDICINE | Facility: HOSPITAL | Age: 34
Discharge: HOME | End: 2024-01-26
Attending: NURSE PRACTITIONER
Payer: COMMERCIAL

## 2024-01-26 DIAGNOSIS — G47.33 OSA (OBSTRUCTIVE SLEEP APNEA): ICD-10-CM

## 2024-01-26 PROCEDURE — 95806 SLEEP STUDY UNATT&RESP EFFT: CPT | Performed by: INTERNAL MEDICINE

## 2024-01-26 NOTE — Clinical Note
2 night study MODERATE OBSTRUCTIVE SLEEP APNEA with overall AHI 13.0/hr (71 events): night #1 Oxygen desaturation: 89%. SpO2 between 90% to 94% for 7 min. Patient snored 95% time above 50 . Heart rate range: 47 bpm - 74bpm REC's: Therapy with APAP at 6-20 cm WP using mask of choice with heated humidification is an option. Please refer to sleep disorders clinic Weight loss/management. with regular exercise per direction of physician. Avoid drowsy driving. SLEEP HYGIENE Follow up in sleep clinic to maximize adherence and ensure resolution of symptoms.

## 2024-01-28 NOTE — PROGRESS NOTES
Subjective:      Patient ID: Howard Dunn is a 34 y.o. male.    Chief Complaint:  Tremors.     History of Present Illness  HPI 34 years old Male with PMHx of Anxiety ds. came for the evaluation and recommendation of Tremors.      Started: about 4 years ago.   Describes: twitching.    Timing: few minutes.   Frequency: comes and goes.   Pain: 0 to 4/ 10 ( Headaches ).   Location: Left.   Family: No Tremors.   Medications: None. .   Worsen: stress / Social anxiety. .   Alleviated: resting.   Associated symptoms: eyes twitching /   Triggers: using hands for small activities / drinking / eating.   Prodrome symptoms: none.      Sent here by PCP - Dr Mcfarland.   Anxiety attacks - trigger some eyes twitching / tense arms and Legs.      No interfere with eating /  drinking.  Does not bother Him / does not interfere with ADL's.   No falls / No smell issues / no shuffling / no mask face / no memory issues / no dysthymia.   Denied dizziness ( lightheadedness ) / no near or syncopal episode.   No falls.     Review of Systems  Review of Systems   Neurological:         Twitching sensation   All other systems reviewed and are negative.    Objective:     Neurologic Exam     Mental Status   Oriented to person, place, and time.   Registration: recalls 3 of 3 objects. Recall at 5 minutes: recalls 3 of 3 objects. Follows 3 step commands.   Attention: normal. Concentration: normal.   Speech: speech is normal   Level of consciousness: alert  Knowledge: good. Able to perform simple calculations.   Able to name object. Able to read. Able to repeat. Able to write. Normal comprehension.     Cranial Nerves     CN II   Visual fields full to confrontation.     CN III, IV, VI   Pupils are equal, round, and reactive to light.  Extraocular motions are normal.   Upgaze: normal  Downgaze: normal  Conjugate gaze: present  Vestibulo-ocular reflex: present    CN V   Facial sensation intact.     CN VII   Facial expression full, symmetric.     CN VIII    CN VIII normal.     CN IX, X   CN IX normal.   CN X normal.     CN XI   CN XI normal.     CN XII   CN XII normal.     Motor Exam   Muscle bulk: normal  Overall muscle tone: normal    Strength   Strength 5/5 throughout.   Right neck flexion: 5/5  Left neck flexion: 5/5  Right neck extension: 5/5  Left neck extension: 5/5  Right deltoid: 5/5  Left deltoid: 5/5  Right biceps: 5/5  Left biceps: 5/5  Right triceps: 5/5  Left triceps: 5/5  Right wrist flexion: 5/5  Left wrist flexion: 5/5  Right wrist extension: 5/5  Left wrist extension: 5/5  Right interossei: 5/5  Left interossei: 5/5  Right abdominals: 5/5  Left abdominals: 5/5  Right iliopsoas: 5/5  Left iliopsoas: 5/5  Right quadriceps: 5/5  Left quadriceps: 5/5  Right hamstrin/5  Left hamstrin/5  Right glutei: 5/5  Left glutei: 5/5  Right anterior tibial: 5/5  Left anterior tibial: 5/5  Right posterior tibial: 5/5  Left posterior tibial: 5/5  Right peroneal: 5/5  Left peroneal: 5/5  Right gastroc: 5/5  Left gastroc: 5/5    Sensory Exam   Light touch normal.   Vibration normal.   Proprioception normal.   Pinprick normal.   Graphesthesia: normal  Stereognosis: normal    Gait, Coordination, and Reflexes     Gait  Gait: normal    Coordination   Romberg: negative  Finger to nose coordination: normal  Heel to shin coordination: normal  Tandem walking coordination: normal    Tremor   Resting tremor: absent  Intention tremor: absent  Action tremor: absent    Reflexes   Right brachioradialis: 2+  Left brachioradialis: 2+  Right biceps: 2+  Left biceps: 2+  Right triceps: 2+  Left triceps: 2+  Right patellar: 2+  Left patellar: 2+  Right achilles: 2+  Left achilles: 2+  Right : 2+  Left : 2+  Right plantar: normal  Left plantar: normal  Right Andrew: absent  Left Andrew: absent  Right ankle clonus: absent  Left ankle clonus: absent  Right pendular knee jerk: absent  Left pendular knee jerk: absent      Physical Exam  Vitals and nursing note reviewed.    Constitutional:       Appearance: Normal appearance. He is normal weight.   HENT:      Head: Normocephalic and atraumatic.      Right Ear: Tympanic membrane normal.      Left Ear: Tympanic membrane normal.      Nose: Nose normal.      Mouth/Throat:      Mouth: Mucous membranes are moist.      Pharynx: Oropharynx is clear.   Eyes:      Extraocular Movements: Extraocular movements intact and EOM normal.      Conjunctiva/sclera: Conjunctivae normal.      Pupils: Pupils are equal, round, and reactive to light.   Cardiovascular:      Rate and Rhythm: Normal rate and regular rhythm.      Pulses: Normal pulses.      Heart sounds: Normal heart sounds.   Pulmonary:      Effort: Pulmonary effort is normal.      Breath sounds: Normal breath sounds.   Abdominal:      General: Abdomen is flat. Bowel sounds are normal.   Genitourinary:     Comments: Deferred.   Musculoskeletal:         General: Normal range of motion.      Cervical back: Normal range of motion and neck supple.   Skin:     General: Skin is warm and dry.      Capillary Refill: Capillary refill takes less than 2 seconds.   Neurological:      Mental Status: He is alert and oriented to person, place, and time. Mental status is at baseline.      Motor: Motor strength is normal.     Coordination: Finger-Nose-Finger Test, Heel to Shin Test and Romberg Test normal.      Gait: Gait is intact. Tandem walk normal.      Deep Tendon Reflexes:      Reflex Scores:       Tricep reflexes are 2+ on the right side and 2+ on the left side.       Bicep reflexes are 2+ on the right side and 2+ on the left side.       Brachioradialis reflexes are 2+ on the right side and 2+ on the left side.       Patellar reflexes are 2+ on the right side and 2+ on the left side.       Achilles reflexes are 2+ on the right side and 2+ on the left side.  Psychiatric:         Mood and Affect: Mood normal.         Speech: Speech normal.         Behavior: Behavior normal.         Thought Content: Thought  content normal.         Judgment: Judgment normal.        Assessment:   Patient Neurological Assessment is non focal.   - Motor Tics.   - Anxiety Ds.     Plan:   34 years old Male with PMHx as above came for the evaluation of adventitious movements ( Tics ) exacerbated by Anxiety attacks.   I do not believe Patient has Tremors.   Definitively no Seizures.   He has Motor tics exacerbated by Anxiety attacks / stress situation.   Continue Buspar and Propranolol.   He does not need any more treatment than above mention.    Labs: CBC / CMP / RPR / HIV / TSH / Lipids panel / Hep C / Hgb A 1 C - done 09/ 2023 / 2022 - non significant abnormalities.     Please do not hesitate to contact me with any updates, questions or concerns.    Akshat Laura MD.  General Neurologist.

## 2024-01-30 PROBLEM — G47.33 OSA (OBSTRUCTIVE SLEEP APNEA): Status: ACTIVE | Noted: 2024-01-30

## 2024-01-30 PROCEDURE — 95806 SLEEP STUDY UNATT&RESP EFFT: CPT | Mod: 26,,, | Performed by: INTERNAL MEDICINE

## 2024-01-30 NOTE — PROCEDURES
"2 night study  MODERATE OBSTRUCTIVE SLEEP APNEA with overall AHI 13.0/hr (71 events):  night #1  Oxygen desaturation: 89%. SpO2 between 90% to 94% for 7 min.  Patient snored 95% time above 50 .  Heart rate range: 47 bpm - 74bpm  REC's:  Therapy with APAP at 6-20 cm WP using mask of choice with heated humidification is an option.  Please refer to sleep disorders clinic  Weight loss/management. with regular exercise per direction of physician.  Avoid drowsy driving.  SLEEP HYGIENE  Follow up in sleep clinic to maximize adherence and ensure resolution of symptoms.      [place]  [address] [facility]  [place] [id number]/Gilberto Saldivar MD         The sleep study that you ordered is complete.  You have ordered sleep LAB services to perform the sleep study for Howard Dunn .      Please find Sleep Study result in  the "Media tab" of Chart Review menu.        You can look  for the report in the  Media by the document type "Sleep Study Documents". Alphabetizing  "Document type" column helps to find the SLEEP STUDY report  Faster.       As the ordering provider, you are responsible for reviewing the results and implementing a treatment plan with your patient.    If you need a Sleep Medicine provider to explain the sleep study findings and arrange treatment for the patient, please refer patient for consultation to our Sleep Clinic via [facility] with Ambulatory Consult Sleep.     To do that please place an order for an  "Ambulatory Consult Sleep" -  order , it will go to our clinic work queue for our staff  to contact the patient for an appointment.      For any questions, please contact our sleep lab  staff at [phone] to talk to clinical staff          Daniele Gerer MD    "

## 2024-01-31 ENCOUNTER — OFFICE VISIT (OUTPATIENT)
Dept: NEUROLOGY | Facility: CLINIC | Age: 34
End: 2024-01-31
Payer: COMMERCIAL

## 2024-01-31 VITALS
HEART RATE: 75 BPM | DIASTOLIC BLOOD PRESSURE: 69 MMHG | SYSTOLIC BLOOD PRESSURE: 112 MMHG | BODY MASS INDEX: 24.35 KG/M2 | WEIGHT: 160.69 LBS | HEIGHT: 68 IN

## 2024-01-31 DIAGNOSIS — F95.0 TRANSIENT MOTOR TIC: ICD-10-CM

## 2024-01-31 PROCEDURE — 1160F RVW MEDS BY RX/DR IN RCRD: CPT | Mod: CPTII,S$GLB,, | Performed by: PSYCHIATRY & NEUROLOGY

## 2024-01-31 PROCEDURE — 3008F BODY MASS INDEX DOCD: CPT | Mod: CPTII,S$GLB,, | Performed by: PSYCHIATRY & NEUROLOGY

## 2024-01-31 PROCEDURE — 99999 PR PBB SHADOW E&M-EST. PATIENT-LVL IV: CPT | Mod: PBBFAC,,, | Performed by: PSYCHIATRY & NEUROLOGY

## 2024-01-31 PROCEDURE — 1159F MED LIST DOCD IN RCRD: CPT | Mod: CPTII,S$GLB,, | Performed by: PSYCHIATRY & NEUROLOGY

## 2024-01-31 PROCEDURE — 3078F DIAST BP <80 MM HG: CPT | Mod: CPTII,S$GLB,, | Performed by: PSYCHIATRY & NEUROLOGY

## 2024-01-31 PROCEDURE — 99203 OFFICE O/P NEW LOW 30 MIN: CPT | Mod: S$GLB,,, | Performed by: PSYCHIATRY & NEUROLOGY

## 2024-01-31 PROCEDURE — 3074F SYST BP LT 130 MM HG: CPT | Mod: CPTII,S$GLB,, | Performed by: PSYCHIATRY & NEUROLOGY

## 2024-02-15 ENCOUNTER — OFFICE VISIT (OUTPATIENT)
Dept: PULMONOLOGY | Facility: CLINIC | Age: 34
End: 2024-02-15
Payer: COMMERCIAL

## 2024-02-15 VITALS — WEIGHT: 160 LBS | BODY MASS INDEX: 24.33 KG/M2

## 2024-02-15 DIAGNOSIS — R00.2 PALPITATIONS: ICD-10-CM

## 2024-02-15 DIAGNOSIS — G47.33 OSA (OBSTRUCTIVE SLEEP APNEA): Primary | ICD-10-CM

## 2024-02-15 DIAGNOSIS — Z72.821 POOR SLEEP HYGIENE: ICD-10-CM

## 2024-02-15 PROCEDURE — 99213 OFFICE O/P EST LOW 20 MIN: CPT | Mod: 95,,, | Performed by: NURSE PRACTITIONER

## 2024-02-15 PROCEDURE — 1159F MED LIST DOCD IN RCRD: CPT | Mod: CPTII,95,, | Performed by: NURSE PRACTITIONER

## 2024-02-15 PROCEDURE — 3008F BODY MASS INDEX DOCD: CPT | Mod: CPTII,95,, | Performed by: NURSE PRACTITIONER

## 2024-02-15 PROCEDURE — 1160F RVW MEDS BY RX/DR IN RCRD: CPT | Mod: CPTII,95,, | Performed by: NURSE PRACTITIONER

## 2024-02-15 NOTE — PATIENT INSTRUCTIONS
[facility]   Toll free 24 hr line for assistance: [phone]   Option 1: CPAP  (press 1 - supplies ([employer]), press 2 questions regarding your machine)  Option 2: Oxygen, Nebulizer, Ventilator  Option 3: service  Option 4: Discharge (, providers, nurses)  Option 5: Diabetics  Option 6: Ortho (ortho braces, walker, wheelchairs, etc)  Option 7: Billing

## 2024-02-15 NOTE — PROGRESS NOTES
Subjective:      Patient ID: Howard Dunn is a 34 y.o. male.    Chief Complaint: Sleep Apnea (Rev slp study /)  The patient location is: [place]  The chief complaint leading to consultation is: sleep  Visit type: Virtual visit with synchronous audio and video  Total time spent with patient: 19 min   Each patient to whom he or she provides medical services by telemedicine is:  (1) informed of the relationship between the physician and patient and the respective role of any other health care provider with respect to management of the patient; and (2) notified that he or she may decline to receive medical services by telemedicine and may withdraw from such care at any time.    HPI  Patient presents to the office today for evaluation of sleep.  Patient with snoring and witnessed apneas. Problems sleeping with poor sleep hygiene. No sleep schedule and electronics on in bedroom.   [place] Sleepiness Scale score 11.  Patient has had symptoms for a few years. He is waking up startled, gasping and with palpitations. He has anxiety with sleep due to waking up startled.  Bedtime: random PM  Wake time: random AM  He had HST.      Patient Active Problem List   Diagnosis    RIVKA (obstructive sleep apnea)     Wt 72.6 kg (160 lb)   BMI 24.33 kg/m²   Body mass index is 24.33 kg/m².    Review of Systems   Constitutional:  Positive for fatigue.   Respiratory:  Positive for snoring and somnolence.    Psychiatric/Behavioral:  Positive for sleep disturbance.    All other systems reviewed and are negative.    Objective:      Physical Exam  Constitutional:       Appearance: Normal appearance. He is well-developed.   HENT:      Head: Normocephalic and atraumatic.      Right Ear: External ear normal.      Left Ear: External ear normal.      Nose: Nose normal.   Eyes:      General: Lids are normal.      Conjunctiva/sclera: Conjunctivae normal.   Pulmonary:      Effort: Pulmonary effort is normal. No tachypnea, bradypnea, accessory  muscle usage or respiratory distress.   Musculoskeletal:      Cervical back: Normal range of motion.   Skin:     Findings: No rash.   Neurological:      Mental Status: He is alert and oriented to person, place, and time.   Psychiatric:         Behavior: Behavior normal.         Thought Content: Thought content normal.         Judgment: Judgment normal.       Personal Diagnostic Review      Procedure Notes    Author Status Last  Updated Created   Daniele Greer MD Signed Daniele Greer MD 1/30/2024  4:55 PM 1/30/2024  4:55 PM          Assoc. Orders Procedures   HOME SLEEP STUDIES HOME SLEEP STUDIES       Pre-Op Dx Post-Op Dx   RIVKA (obstructive sleep apnea) None         2 night study  MODERATE OBSTRUCTIVE SLEEP APNEA with overall AHI 13.0/hr (71 events):  night #1  Oxygen desaturation: 89%. SpO2 between 90% to 94% for 7 min.  Patient snored 95% time above 50 .  Heart rate range: 47 bpm - 74bpm  REC's:  Therapy with APAP at 6-20 cm WP using mask of choice with heated humidification is an option.  Please refer to sleep disorders clinic  Weight loss/management. with regular exercise per direction of physician.  Avoid drowsy driving.  SLEEP HYGIENE  Follow up in sleep clinic to maximize adherence and ensure resolution of symptoms.        [place], [place]  [address] [facility]  [place] [id number]/Gilberto Saldivar MD             Assessment:       1. RIVKA (obstructive sleep apnea)    2. Poor sleep hygiene    3. Palpitations        Outpatient Encounter Medications as of 2/15/2024   Medication Sig Dispense Refill    busPIRone (BUSPAR) 10 MG tablet Take 1 tablet (10 mg total) by mouth 3 (three) times daily as needed (anxiety). 90 tablet 11    propranoloL (INDERAL LA) 60 MG 24 hr capsule Take 2 capsules (120 mg total) by mouth every evening. 180 capsule 3     No facility-administered encounter medications on file as of 2/15/2024.     Orders Placed This Encounter   Procedures    CPAP FOR  HOME USE     Order Specific Question:   Length of need (1-99 months):     Answer:   99     Order Specific Question:   Type ():     Answer:   Auto CPAP     Order Specific Question:   Auto CPAP pressure setting range (cmH20):     Answer:   5-18     Order Specific Question:   Fulfillment Priority:     Answer:   Level 4:  all others     Order Specific Question:   Humidification ():     Answer:   Heated     Order Specific Question:   Choose ONE mask type and its corresponding cushions and/or pillows:     Answer:    Full Face Mask, 1 per 90 days:  Full Face Cushion, (3 per 90 days)     Order Specific Question:   Choose EITHER Heated or Non-Heated Tubjing     Answer:    Non-Heated Tubing, 1 per 90 days     Order Specific Question:   All other supplies as needed as listed below:     Answer:    Headgear, 1 per 180 days     Order Specific Question:   All other supplies as needed as listed below:     Answer:    Disposable Filter, 6 per 90 days     Order Specific Question:   All other supplies as needed as listed below:     Answer:    Non-Disposable Filter, 1 per 180 days     Order Specific Question:   All other supplies as needed as listed below:     Answer:    Humidifier Chamber, 1 per 180 days     Order Specific Question:   All other supplies as needed as listed below:     Answer:    Chin Strap, 1 per 180 days     Plan:   AutoPAP and follow up in 10 weeks with download of data card and review of symptoms.  Stop energy drinks  Sleep scheduling and stimulus control    Problem List Items Addressed This Visit          Other    RIVKA (obstructive sleep apnea) - Primary    Relevant Orders    CPAP FOR HOME USE     Other Visit Diagnoses       Poor sleep hygiene        Palpitations                I spent a total of 25 minutes on the day of the visit.  This includes face to face time and non-face to face time preparing to see the patient (eg, review of tests), obtaining and/or reviewing  separately obtained history, documenting clinical information in the electronic or other health record, independently interpreting results and communicating results to the patient/family/caregiver, or care coordinator.               Elizabeth LeJeune, ACNP, ANP

## 2024-05-02 ENCOUNTER — OFFICE VISIT (OUTPATIENT)
Dept: PULMONOLOGY | Facility: CLINIC | Age: 34
End: 2024-05-02
Payer: COMMERCIAL

## 2024-05-02 VITALS — BODY MASS INDEX: 23.19 KG/M2 | HEIGHT: 68 IN | WEIGHT: 153 LBS

## 2024-05-02 DIAGNOSIS — R00.2 PALPITATIONS: ICD-10-CM

## 2024-05-02 DIAGNOSIS — G47.33 OSA (OBSTRUCTIVE SLEEP APNEA): Primary | ICD-10-CM

## 2024-05-02 DIAGNOSIS — Z72.821 POOR SLEEP HYGIENE: ICD-10-CM

## 2024-05-02 PROCEDURE — 1159F MED LIST DOCD IN RCRD: CPT | Mod: CPTII,95,, | Performed by: NURSE PRACTITIONER

## 2024-05-02 PROCEDURE — 99213 OFFICE O/P EST LOW 20 MIN: CPT | Mod: 95,,, | Performed by: NURSE PRACTITIONER

## 2024-05-02 PROCEDURE — 1160F RVW MEDS BY RX/DR IN RCRD: CPT | Mod: CPTII,95,, | Performed by: NURSE PRACTITIONER

## 2024-05-02 PROCEDURE — 3008F BODY MASS INDEX DOCD: CPT | Mod: CPTII,95,, | Performed by: NURSE PRACTITIONER

## 2024-05-02 NOTE — PROGRESS NOTES
"Subjective:      Patient ID: Howard Dunn is a 34 y.o. male.    Chief Complaint: Sleep Apnea  The patient location is: [place]  The chief complaint leading to consultation is: sleep apnea  Visit type: Virtual visit with synchronous audio and video  Total time spent with patient: 10 min   Each patient to whom he or she provides medical services by telemedicine is:  (1) informed of the relationship between the physician and patient and the respective role of any other health care provider with respect to management of the patient; and (2) notified that he or she may decline to receive medical services by telemedicine and may withdraw from such care at any time.    HPI    Presents for sleep apnea on AutoPAP therapy. Patient states improved symptoms with use of AutoPAP. Sleeping more soundly. Waking up feeling more refreshed. Improved daytime sleepiness. Patient states he is benefiting from use of the AutoPAP.    Bedtime more consistent. Bed around 11 PM. Waking up earlier than desire around 5:30 AM. Goes back to sleep for 1-2 hr.      Patient Active Problem List   Diagnosis    RIVKA (obstructive sleep apnea)     Ht 5' 8" (1.727 m)   Wt 69.4 kg (153 lb)   BMI 23.26 kg/m²   Body mass index is 23.26 kg/m².    Review of Systems   Constitutional: Negative.    HENT: Negative.     Respiratory: Negative.     Cardiovascular: Negative.    Musculoskeletal: Negative.    Gastrointestinal: Negative.    Neurological: Negative.    Psychiatric/Behavioral: Negative.       Objective:      Physical Exam  Constitutional:       Appearance: Normal appearance. He is well-developed.   HENT:      Head: Normocephalic and atraumatic.      Nose: Nose normal.   Eyes:      General: Lids are normal.      Conjunctiva/sclera: Conjunctivae normal.   Pulmonary:      Effort: Pulmonary effort is normal. No tachypnea, bradypnea, accessory muscle usage or respiratory distress.   Musculoskeletal:      Cervical back: Normal range of motion.   Skin:     " Findings: No rash.   Neurological:      Mental Status: He is alert and oriented to person, place, and time.   Psychiatric:         Behavior: Behavior normal.         Thought Content: Thought content normal.         Judgment: Judgment normal.       Personal Diagnostic Review      5/2/2024     9:06 AM   EPWORTH SLEEPINESS SCALE   Sitting and reading 0   Watching TV 0   Sitting, inactive in a public place (e.g. a theatre or a meeting) 0   As a passenger in a car for an hour without a break 0   Lying down to rest in the afternoon when circumstances permit 1   Sitting and talking to someone 0   Sitting quietly after a lunch without alcohol 1   In a car, while stopped for a few minutes in traffic 0   Total score 2          Initial compliance period 03/28/2024 - 04/26/2024  Compliance met Yes  Compliance percentage 93%  Payor Standard  Usage 03/28/2024 - 04/26/2024  Usage days 30/30 days (100%)  >= 4 hours 28 days (93%)  < 4 hours 2 days (7%)  Usage hours 174 hours 34 minutes  Average usage (total days) 5 hours 49 minutes  Average usage (days used) 5 hours 49 minutes  Median usage (days used) 5 hours 44 minutes  Total used hours (value since last reset - 04/26/2024) 167 hours  AirSense 11 AutoSet  Serial number [id number]  Mode AutoSet  Min Pressure 5 cmH2O  Max Pressure 18 cmH2O  EPR Fulltime  EPR level 3  Response Standard  Therapy  Pressure - cmH2O Median: 7.5 95th percentile: 11.4 Maximum: 13.0  Leaks - L/min Median: 0.0 95th percentile: 0.6 Maximum: 16.0  Events per hour AI: 5.1 HI: 0.4 AHI: 5.5  Apnea Index Central: 1.5 Obstructive: 3.4 Unknown: 0.1  RERA Index 0.1  Cheyne-Ibanez respiration (average duration per night) 0 minutes (0%)  Usage - hours        Assessment:       1. RIVKA (obstructive sleep apnea)    2. Poor sleep hygiene    3. Palpitations        Outpatient Encounter Medications as of 5/2/2024   Medication Sig Dispense Refill    busPIRone (BUSPAR) 10 MG tablet Take 1 tablet (10 mg total) by mouth 3 (three)  times daily as needed (anxiety). 90 tablet 11    propranoloL (INDERAL LA) 60 MG 24 hr capsule Take 2 capsules (120 mg total) by mouth every evening. 180 capsule 3     No facility-administered encounter medications on file as of 5/2/2024.     No orders of the defined types were placed in this encounter.    Plan:   Compliant with PAP and benefits from use. Follow up annually in the sleep clinic.      1. RIVKA (obstructive sleep apnea)    2. Poor sleep hygiene  Comments:  Improved    3. Palpitations  Comments:  Improved                        Elizabeth LeJeune, ACNP, ANP

## 2024-10-14 ENCOUNTER — OFFICE VISIT (OUTPATIENT)
Dept: PRIMARY CARE CLINIC | Facility: CLINIC | Age: 34
End: 2024-10-14
Payer: COMMERCIAL

## 2024-10-14 VITALS
SYSTOLIC BLOOD PRESSURE: 108 MMHG | HEART RATE: 60 BPM | HEIGHT: 68 IN | DIASTOLIC BLOOD PRESSURE: 80 MMHG | WEIGHT: 158.06 LBS | BODY MASS INDEX: 23.95 KG/M2

## 2024-10-14 DIAGNOSIS — R00.0 TACHYCARDIA: ICD-10-CM

## 2024-10-14 DIAGNOSIS — F32.A ANXIETY AND DEPRESSION: ICD-10-CM

## 2024-10-14 DIAGNOSIS — F41.9 ANXIETY AND DEPRESSION: ICD-10-CM

## 2024-10-14 DIAGNOSIS — Z00.00 ANNUAL PHYSICAL EXAM: Primary | ICD-10-CM

## 2024-10-14 DIAGNOSIS — R25.3 MUSCLE TWITCHING: ICD-10-CM

## 2024-10-14 DIAGNOSIS — F95.0 TRANSIENT MOTOR TIC: ICD-10-CM

## 2024-10-14 PROCEDURE — 99999 PR PBB SHADOW E&M-EST. PATIENT-LVL III: CPT | Mod: PBBFAC,,, | Performed by: FAMILY MEDICINE

## 2024-10-14 PROCEDURE — 1159F MED LIST DOCD IN RCRD: CPT | Mod: CPTII,S$GLB,, | Performed by: FAMILY MEDICINE

## 2024-10-14 PROCEDURE — 99395 PREV VISIT EST AGE 18-39: CPT | Mod: S$GLB,,, | Performed by: FAMILY MEDICINE

## 2024-10-14 PROCEDURE — 3074F SYST BP LT 130 MM HG: CPT | Mod: CPTII,S$GLB,, | Performed by: FAMILY MEDICINE

## 2024-10-14 PROCEDURE — 3079F DIAST BP 80-89 MM HG: CPT | Mod: CPTII,S$GLB,, | Performed by: FAMILY MEDICINE

## 2024-10-14 PROCEDURE — 3008F BODY MASS INDEX DOCD: CPT | Mod: CPTII,S$GLB,, | Performed by: FAMILY MEDICINE

## 2024-10-14 RX ORDER — PROPRANOLOL HYDROCHLORIDE 60 MG/1
120 CAPSULE, EXTENDED RELEASE ORAL 2 TIMES DAILY PRN
Qty: 180 CAPSULE | Refills: 3 | Status: SHIPPED | OUTPATIENT
Start: 2024-10-14 | End: 2025-10-14

## 2024-10-14 RX ORDER — BUSPIRONE HYDROCHLORIDE 10 MG/1
10 TABLET ORAL 3 TIMES DAILY PRN
Qty: 90 TABLET | Refills: 11 | Status: SHIPPED | OUTPATIENT
Start: 2024-10-14 | End: 2025-10-14

## 2024-10-14 NOTE — PATIENT INSTRUCTIONS
Physically, everything looks really good today.      Labs we have done in the past all look fine.  No signs of diabetes, nor even prediabetes.  Let us plan to recheck things when you turn 36.    Refills your medicines have been sent to the pharmacy.  Please continue taking them, as directed.    Continue to eat a healthy diet.  Be careful with portion sizes.  Includes lots of fresh fruits, vegetables, whole grains, lean proteins.  See info below.    Keep hydrated.  Be sure to drink at least 8-10, 8 oz, glasses of water every day.    Stay active.  Try to do some sort of physical activity every day.  Nothing outrageous, just try walking for 10-15 minutes each day.     If the ear continues to bother you, or if it gets worse, just let me know.  At that time, we will get you in with ENT to get another opinion.

## 2024-10-14 NOTE — PROGRESS NOTES
"    [facility]       [address] [place] [address]      Phone: [phone]      Fax: [phone]    Gilberto Saldivar MD                Office Visit  10/14/2024        Subjective      HPI:  Howard Dunn is a 34 y.o. male presents today in clinic for "Annual Exam and Ear Fullness  ."     34-year-old gentleman presents today for annual wellness exam.    Feels good today.  No chest pain, shortness on breath.  No fever, chills, body aches.  No coughing, sneezing, URI type symptoms.  Appetite normal.  Bowel movements normal.  No urinary issues.    Has a motor tic.  Muscle spasms.  Saw Neurology.  They said it is not a tremor.  Just more related to anxiety/stress.  They recommended he continue his BuSpar/propranolol.      As above, has issues with anxiety.  Takes the motor tics worse.  Uses BuSpar, as needed.  Typically takes propranolol at bedtime.  Then, takes another dose during the day, if he needs it.    Has been having an issue with his left ear.  Several years ago, in [place], he had a similar issue.  Saw the audiologist, had hearing testing, other vestibular testing done.  Everything came back okay.  At that time, turns out he just had a mild ear infection.  Got treated and symptoms resolved.  About five or six days ago, the ear started feeling full.  No pains.  Feels like it is not coming from inside the ear canal, but rather in the space posterior to the ear.    PMH: Anxiety/depression.  GERD.  Hiatal hernia.    PSH: Inguinal hernia repair as a baby.  EGD.    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    T: Denies   A:  Denies current use.  Quit February, 2021.    D: Denies    Exercise:  Walks occasionally        The following were updated and reviewed by myself in the chart: medications, past medical history, past surgical history, family history, social history, and allergies. "     Medications:  Current Outpatient Medications on File Prior to Visit   Medication Sig Dispense Refill    [DISCONTINUED] busPIRone (BUSPAR) 10 MG tablet Take 1 tablet (10 mg total) by mouth 3 (three) times daily as needed (anxiety). 90 tablet 11    [DISCONTINUED] propranoloL (INDERAL LA) 60 MG 24 hr capsule Take 2 capsules (120 mg total) by mouth every evening. 180 capsule 3     No current facility-administered medications on file prior to visit.        PMHx:  Past Medical History:   Diagnosis Date    Anxiety       Patient Active Problem List    Diagnosis Date Noted    RIVKA (obstructive sleep apnea) 01/30/2024        PSHx:  Past Surgical History:   Procedure Laterality Date    HERNIA REPAIR      as an infant        FHx:  Family History   Problem Relation Name Age of Onset    No Known Problems Mother      COPD Father Howard Mcqueenminger     Alcohol abuse Father Howard Richarminger     Bipolar disorder Father Howard Mcqueenminger         Social:  Social History     Socioeconomic History    Marital status: Single   Tobacco Use    Smoking status: Never     Passive exposure: Never    Smokeless tobacco: Never   Substance and Sexual Activity    Alcohol use: Not Currently     Comment: 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        Allergies:  Review of patient's allergies indicates:  No Known Allergies     ROS:  Review of Systems   Constitutional:  Negative for activity change, appetite change, chills, fever and unexpected weight change.   HENT:  Negative for congestion, hearing loss, postnasal drip, rhinorrhea, sore throat and trouble swallowing.    Eyes:  Negative for discharge and visual disturbance.   Respiratory:  Negative for cough, chest tightness, shortness of breath and wheezing.    Cardiovascular:  Negative for chest pain and palpitations.   Gastrointestinal:  Negative for abdominal pain, blood in stool,  "constipation, diarrhea, nausea and vomiting.   Endocrine: Negative for polydipsia and polyuria.   Genitourinary:  Negative for difficulty urinating, hematuria and urgency.   Musculoskeletal:  Negative for arthralgias, joint swelling, myalgias and neck pain.   Skin:  Negative for color change and rash.   Neurological:  Negative for weakness and headaches.   Psychiatric/Behavioral:  Negative for confusion and dysphoric mood.    All other systems reviewed and are negative.         Objective      /80   Pulse 60   Ht 5' 8" (1.727 m)   Wt 71.7 kg (158 lb 1.1 oz)   BMI 24.03 kg/m²   Ht Readings from Last 3 Encounters:   10/14/24 5' 8" (1.727 m)   05/02/24 5' 8" (1.727 m)   01/31/24 5' 8" (1.727 m)     Wt Readings from Last 3 Encounters:   10/14/24 71.7 kg (158 lb 1.1 oz)   05/02/24 69.4 kg (153 lb)   02/15/24 72.6 kg (160 lb)       PHYSICAL EXAM:  Physical Exam  Vitals and nursing note reviewed.   Constitutional:       General: He is not in acute distress.     Appearance: Normal appearance.   HENT:      Head: Normocephalic and atraumatic.      Right Ear: Tympanic membrane, ear canal and external ear normal.      Left Ear: Tympanic membrane, ear canal and external ear normal.      Nose: Nose normal. No congestion or rhinorrhea.      Mouth/Throat:      Mouth: Mucous membranes are moist.      Pharynx: Oropharynx is clear. No oropharyngeal exudate or posterior oropharyngeal erythema.   Eyes:      Extraocular Movements: Extraocular movements intact.      Conjunctiva/sclera: Conjunctivae normal.      Pupils: Pupils are equal, round, and reactive to light.   Cardiovascular:      Rate and Rhythm: Normal rate and regular rhythm.   Pulmonary:      Effort: Pulmonary effort is normal.      Breath sounds: No wheezing, rhonchi or rales.   Musculoskeletal:         General: Normal range of motion.      Cervical back: Normal range of motion.   Lymphadenopathy:      Cervical: No cervical adenopathy.   Skin:     General: Skin is " warm and dry.   Neurological:      General: No focal deficit present.      Mental Status: He is alert.              LABS / IMAGING:  No results found for this or any previous visit (from the past 26 weeks).      Assessment    1. Annual physical exam    2. Anxiety and depression    3. Tachycardia    4. Transient motor tic    5. Muscle twitching          Plan    Howard was seen today for annual exam and ear fullness.    Diagnoses and all orders for this visit:    Annual physical exam    Anxiety and depression  -     busPIRone (BUSPAR) 10 MG tablet; Take 1 tablet (10 mg total) by mouth 3 (three) times daily as needed (anxiety).  -     propranoloL (INDERAL LA) 60 MG 24 hr capsule; Take 2 capsules (120 mg total) by mouth 2 (two) times daily as needed (anxiety/panic attacks).    Tachycardia  -     propranoloL (INDERAL LA) 60 MG 24 hr capsule; Take 2 capsules (120 mg total) by mouth 2 (two) times daily as needed (anxiety/panic attacks).    Transient motor tic  -     propranoloL (INDERAL LA) 60 MG 24 hr capsule; Take 2 capsules (120 mg total) by mouth 2 (two) times daily as needed (anxiety/panic attacks).    Muscle twitching  -     propranoloL (INDERAL LA) 60 MG 24 hr capsule; Take 2 capsules (120 mg total) by mouth 2 (two) times daily as needed (anxiety/panic attacks).    Physically, everything looks good.      Reviewed previous lab work.  Most everything looks fine.  We can plan to repeat labs around age 36.    The ear looks okay.  No wax.  Eardrum normal.  No posterior auricular nodes, no occipital nodes.  Wonder what he is feeling could be just some muscle spasms in that area?  If symptoms worsen, he will let us know and we can get him in with the ENT for another opinion.    Med refills, as above.    For his anxiety, discuss adding a daily medication, such as Prozac.  He states that he has been in recovery for over four years and would rather stay away from medication like that.    FOLLOW-UP:  Follow up in about 1 year  (around 10/14/2025) for annual exam.    I spent a total of 30 minutes face to face and non-face to face on the date of this visit.This includes time preparing to see the patient (eg, review of tests, notes), obtaining and/or reviewing additional history from an independent historian and/or outside medical records, documenting clinical information in the electronic health record, independently interpreting results and/or communicating results to the patient/family/caregiver, or care coordinator.  Visit today included increased complexity associated with the care of the episodic problem addressed and managing the longitudinal care of the patient due to the serious and/or complex managed problem(s).    Signed by:  Gilberto Saldivar MD

## 2024-10-21 ENCOUNTER — PATIENT MESSAGE (OUTPATIENT)
Dept: PRIMARY CARE CLINIC | Facility: CLINIC | Age: 34
End: 2024-10-21

## 2024-10-21 ENCOUNTER — OFFICE VISIT (OUTPATIENT)
Dept: PRIMARY CARE CLINIC | Facility: CLINIC | Age: 34
End: 2024-10-21
Payer: COMMERCIAL

## 2024-10-21 DIAGNOSIS — R41.840 DIFFICULTY CONCENTRATING: Primary | ICD-10-CM

## 2024-10-21 PROCEDURE — 99214 OFFICE O/P EST MOD 30 MIN: CPT | Mod: 95,,, | Performed by: FAMILY MEDICINE

## 2024-10-21 PROCEDURE — G2211 COMPLEX E/M VISIT ADD ON: HCPCS | Mod: 95,,, | Performed by: FAMILY MEDICINE

## 2024-10-21 NOTE — PROGRESS NOTES
"    [facility]       [address] [place] [address]      Phone: [phone]      Fax: [phone]    Gilberto Saldivar MD                Video Visit  10/21/2024        Subjective      HPI:  Howard Dunn is a 34 y.o. male presents today via video for "No chief complaint on file.  ."     34-year-old gentleman presents today to discuss difficulty concentrating.    Physically, feels fine.  No chest pain, shortness on breath.  No fever, chills, body aches.  No coughing, sneezing, URI type symptoms.  Appetite normal.  Bowel movements normal.  No urinary issues.    Has a motor tic.  Muscle spasms.  Saw Neurology.  They said it is not a tremor.  Just more related to anxiety/stress.  They recommended he continue his BuSpar/propranolol.      As above, has issues with anxiety.  Takes the motor tics worse.  Uses BuSpar, as needed.  Typically takes propranolol at bedtime.  Then, takes another dose during the day, if he needs it.    States that he would like to discuss getting started on some ADHD medicine.  Never had the diagnosis, but has always had difficulty focusing, concentrating.  Lately, seems to be worse than ever.  Has a hard time keeping a job because he gets off task easily.  As above, has anxiety issues.  Goes to a therapist/counselor.  Has seen a psychiatrist in the past.  Again, never quite got tested.  States symptoms are starting to affect life, relationships.    PMH: Anxiety/depression.  GERD.  Hiatal hernia.    PSH: Inguinal hernia repair as a baby.  EGD.    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    T: Denies   A:  Denies current use.  Quit February, 2021.    D: Denies    Exercise:  Walks occasionally        The following were updated and reviewed by myself in the chart: medications, past medical history, past surgical history, family history, social history, and allergies. "     Medications:  Current Outpatient Medications on File Prior to Visit   Medication Sig Dispense Refill    busPIRone (BUSPAR) 10 MG tablet Take 1 tablet (10 mg total) by mouth 3 (three) times daily as needed (anxiety). 90 tablet 11    propranoloL (INDERAL LA) 60 MG 24 hr capsule Take 2 capsules (120 mg total) by mouth 2 (two) times daily as needed (anxiety/panic attacks). 180 capsule 3     No current facility-administered medications on file prior to visit.        PMHx:  Past Medical History:   Diagnosis Date    Anxiety       Patient Active Problem List    Diagnosis Date Noted    RIVKA (obstructive sleep apnea) 01/30/2024        PSHx:  Past Surgical History:   Procedure Laterality Date    HERNIA REPAIR      as an infant        FHx:  Family History   Problem Relation Name Age of Onset    No Known Problems Mother      COPD Father Howard Richarminger     Alcohol abuse Father Howard Richarminger     Bipolar disorder Father Howard Richarminger         Social:  Social History     Socioeconomic History    Marital status: Single   Tobacco Use    Smoking status: Never     Passive exposure: Never    Smokeless tobacco: Never   Substance and Sexual Activity    Alcohol use: Not Currently     Comment: 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        Allergies:  Review of patient's allergies indicates:  No Known Allergies     ROS:  Review of Systems   Constitutional:  Negative for activity change, appetite change, chills, fever and unexpected weight change.   HENT:  Negative for congestion, hearing loss, postnasal drip, rhinorrhea, sore throat and trouble swallowing.    Eyes:  Negative for discharge and visual disturbance.   Respiratory:  Negative for cough, chest tightness, shortness of breath and wheezing.    Cardiovascular:  Negative for chest pain and palpitations.   Gastrointestinal:  Negative for abdominal pain, blood  "in stool, constipation, diarrhea, nausea and vomiting.   Endocrine: Negative for polydipsia and polyuria.   Genitourinary:  Negative for difficulty urinating, hematuria and urgency.   Musculoskeletal:  Negative for arthralgias, joint swelling, myalgias and neck pain.   Skin:  Negative for color change and rash.   Neurological:  Negative for weakness and headaches.   Psychiatric/Behavioral:  Positive for decreased concentration. Negative for confusion and dysphoric mood.    All other systems reviewed and are negative.         Objective      There were no vitals taken for this visit.  Ht Readings from Last 3 Encounters:   10/14/24 5' 8" (1.727 m)   05/02/24 5' 8" (1.727 m)   01/31/24 5' 8" (1.727 m)     Wt Readings from Last 3 Encounters:   10/14/24 71.7 kg (158 lb 1.1 oz)   05/02/24 69.4 kg (153 lb)   02/15/24 72.6 kg (160 lb)       PHYSICAL EXAM:  Physical Exam  Constitutional:       General: He is not in acute distress.     Appearance: Normal appearance. He is not ill-appearing.   HENT:      Head: Normocephalic and atraumatic.   Pulmonary:      Effort: Pulmonary effort is normal. No respiratory distress.   Neurological:      Mental Status: He is alert.   Psychiatric:         Mood and Affect: Mood normal.         Behavior: Behavior normal.         Thought Content: Thought content normal.              LABS / IMAGING:  No results found for this or any previous visit (from the past 26 weeks).      Assessment    1. Difficulty concentrating          Plan    Diagnoses and all orders for this visit:    Difficulty concentrating  -     Ambulatory referral/consult to Psychiatry; Future    Referral to [facility] placed for ADHD eval.  Also gave info on [facility].    Once we get tested, if they gave the actual diagnosis, then we can try using stimulant medication for treatment.    FOLLOW-UP:  Follow up if symptoms worsen or fail to improve.    The patient location is:  [place]  The chief complaint " leading to consultation is:  Difficulty concentrating, ADHD eval    Visit type: audiovisual    Face to Face time with patient: 15 minutes    30 minutes of total time spent on the encounter, which includes face to face time and non-face to face time preparing to see the patient (eg, review of tests), Obtaining and/or reviewing separately obtained history, Documenting clinical information in the electronic or other health record, Independently interpreting results (not separately reported) and communicating results to the patient/family/caregiver, or Care coordination (not separately reported). Visit today included increased complexity associated with the care of the episodic problem addressed and managing the longitudinal care of the patient due to the serious and/or complex managed problem(s).    Each patient to whom he or she provides medical services by telemedicine is:  (1) informed of the relationship between the physician and patient and the respective role of any other health care provider with respect to management of the patient; and (2) notified that he or she may decline to receive medical services by telemedicine and may withdraw from such care at any time.    Signed by:  Gilberto Saldivar MD

## 2024-10-21 NOTE — PATIENT INSTRUCTIONS
I am sending a referral to [facility] to see about getting you an appointment for ADHD testing.  Someone from there should call you in the next couple of days.  Be aware, we only have a couple of people on staff who do the actual testing, so it can take several months before your appointment.  When they call, take whatever appointment date is offered, but ask them to put you on the wait list in case something comes up sooner.     You may also try calling the [facility] at [phone].  They are located at [address] [place] [address], in [place].  They have several psychologists on staff who can administer the testing.  When you call them, if they need a referral from us, please let me know.  I would be happy to send it to them.       Alternatively, you may want to try calling the [facility] in [place].  They have several psychologists (Dr. Calderon, Dr. Dan, Dr. Roman, Dr. Mcclendon) who also do this type of testing.  You can contact them at [phone].  As above, if they need a referral from us, please let us know.  We will be happy to send it.

## 2024-12-17 ENCOUNTER — PATIENT MESSAGE (OUTPATIENT)
Dept: PSYCHIATRY | Facility: CLINIC | Age: 34
End: 2024-12-17
Payer: COMMERCIAL

## 2025-01-10 ENCOUNTER — E-VISIT (OUTPATIENT)
Dept: URGENT CARE | Facility: CLINIC | Age: 35
End: 2025-01-10
Payer: COMMERCIAL

## 2025-01-10 DIAGNOSIS — B00.1 COLD SORE: Primary | ICD-10-CM

## 2025-01-10 RX ORDER — VALACYCLOVIR HYDROCHLORIDE 500 MG/1
500 TABLET, FILM COATED ORAL 2 TIMES DAILY
Qty: 10 TABLET | Refills: 1 | Status: SHIPPED | OUTPATIENT
Start: 2025-01-10 | End: 2025-01-15

## 2025-01-10 NOTE — PROGRESS NOTES
Patient ID: Howard Dunn is a 35 y.o. male.    Chief Complaint: General Illness (Entered automatically based on patient selection in [employer].)          274}  The patient initiated a request through [employer] on 1/10/2025 for evaluation and management with a chief complaint of General Illness (Entered automatically based on patient selection in [employer].)     I evaluated the questionnaire submission on 01/10/2025 .    Total Time (in minutes): 7     [facility] Evisit [facility]-Medication    1/10/2025 11:54 AM CST - Filed by Patient   What do you need help with? Medication Request   Do you agree to participate in an E-Visit? Yes   If you have any of the following symptoms, please present to your local emergency room or call 911:  I acknowledge   Medication requests for narcotics will not be addressed via an E-Visit.  Please schedule an appointment. I acknowledge   Do you want to address a new or existing medication? I would like to start a new medication that I do not already take   What is the main issue you would like addressed today? Cold sore   What is the name of the medication that you would like to start? Acyclovir   Have you taken a similar medication in the past? Yes   What was the name of the similar medication?    Why are you no longer on that medication? No specific reason    What medical condition is the  medication intended to treat? Cold sores   Provide any additional information you feel is important. I currently have a cold sore and want something stronger than OTC medication   Please attach any relevant images or files    Are you able to take your vital signs? No          Active Problem List with Overview Notes    Diagnosis Date Noted    RIVKA (obstructive sleep apnea) 01/30/2024      Recent Labs Obtained:  Lab Results   Component Value Date    HGBA1C 5.2 03/22/2022    TSH 2.171 09/21/2023      Review of patient's allergies indicates:  No Known Allergies    Encounter Diagnosis   Name Primary?    Cold  sore Yes        No orders of the defined types were placed in this encounter.     Medications Ordered This Encounter   Medications    valACYclovir (VALTREX) 500 MG tablet     Sig: Take 1 tablet (500 mg total) by mouth 2 (two) times daily. for 5 days     Dispense:  10 tablet     Refill:  1        E-Visit Time Tracking:    Day 1 Time (in minutes): 7    Total Time (in minutes): 7      274}

## 2025-01-28 DIAGNOSIS — F32.A ANXIETY AND DEPRESSION: ICD-10-CM

## 2025-01-28 DIAGNOSIS — F41.9 ANXIETY AND DEPRESSION: ICD-10-CM

## 2025-01-29 RX ORDER — BUSPIRONE HYDROCHLORIDE 10 MG/1
TABLET ORAL
Qty: 90 TABLET | Refills: 11 | Status: SHIPPED | OUTPATIENT
Start: 2025-01-29

## 2025-01-29 NOTE — TELEPHONE ENCOUNTER
Refill Routing Note   Medication(s) are not appropriate for processing by [facility] for the following reason(s):        Outside of protocol    [facility] action(s):  Route               Appointments  past 12m or future 3m with PCP    Date Provider   Last Visit   10/21/2024 Gilberto Saldivar MD   Next Visit   Visit date not found Gilberto Saldivar MD   ED visits in past 90 days: 0        Note composed:8:28 AM 01/29/2025

## 2025-01-29 NOTE — TELEPHONE ENCOUNTER
Care Due:                  Date            Visit Type   Department     Provider  --------------------------------------------------------------------------------                                ESTABLISHED                              PATIENT -    [facility]  Last Visit: 10-      VIRTUAL      CARE           Gilberto Saldivar  Next Visit: None Scheduled  None         None Found                                                            Last  Test          Frequency    Reason                     Performed    Due Date  --------------------------------------------------------------------------------    CBC.........  12 months..  valACYclovir.............  Not Found    Overdue    Cr..........  12 months..  valACYclovir.............  Not Found    Overdue    [employer] Embedded Care Due Messages. Reference number: [id number].   1/28/2025 7:51:55 PM CST

## 2025-03-28 DIAGNOSIS — F95.0 TRANSIENT MOTOR TIC: ICD-10-CM

## 2025-03-28 DIAGNOSIS — F41.9 ANXIETY AND DEPRESSION: ICD-10-CM

## 2025-03-28 DIAGNOSIS — F32.A ANXIETY AND DEPRESSION: ICD-10-CM

## 2025-03-28 DIAGNOSIS — R25.3 MUSCLE TWITCHING: ICD-10-CM

## 2025-03-28 DIAGNOSIS — R00.0 TACHYCARDIA: ICD-10-CM

## 2025-03-28 NOTE — TELEPHONE ENCOUNTER
No care due was identified.  [facility] Embedded Care Due Messages. Reference number: [id number].   3/28/2025 3:23:01 PM CDT

## 2025-03-29 RX ORDER — PROPRANOLOL HYDROCHLORIDE 60 MG/1
CAPSULE, EXTENDED RELEASE ORAL
Qty: 180 CAPSULE | Refills: 1 | Status: SHIPPED | OUTPATIENT
Start: 2025-03-29

## 2025-03-29 NOTE — TELEPHONE ENCOUNTER
Refill Authorization Note     Refill Decision Note   Howard Dunn  is requesting a refill authorization.  Brief Assessment and Rationale for Refill:  Approve     Medication Therapy Plan:       Medication Reconciliation Completed: No   Comments:     No Care Gaps recommended.     Note composed:11:03 AM 03/29/2025

## 2025-05-01 ENCOUNTER — OFFICE VISIT (OUTPATIENT)
Dept: PULMONOLOGY | Facility: CLINIC | Age: 35
End: 2025-05-01
Payer: COMMERCIAL

## 2025-05-01 VITALS — BODY MASS INDEX: 23.95 KG/M2 | HEIGHT: 68 IN | WEIGHT: 158.06 LBS

## 2025-05-01 DIAGNOSIS — G47.33 OSA (OBSTRUCTIVE SLEEP APNEA): Primary | ICD-10-CM

## 2025-05-01 NOTE — PROGRESS NOTES
"  Patient ID: Howard Dunn is a 35 y.o. male.    Chief Complaint: Sleep Apnea  The patient location is: [place]  The chief complaint leading to consultation is: sleep apnea  Visit type: Virtual visit with synchronous audio and video  Total time spent with patient: 8 min   Each patient to whom he or she provides medical services by telemedicine is:  (1) informed of the relationship between the physician and patient and the respective role of any other health care provider with respect to management of the patient; and (2) notified that he or she may decline to receive medical services by telemedicine and may withdraw from such care at any time.      Subjective:      History of Present Illness    HPI:  Mr. Dunn reports inconsistent use of his AutoPAP device, with usage varying from day to day. He estimates using the device approximately 50% of the time, often alternating between periods of use lasting a few days to a week, followed by periods of non-use. When he does use the device, he wears it for an average of 6 hours per night. He benefits from use and can see benefit after wearing for a few nights. He has occasional dry mouth while using the CPAP, though this symptom is not consistent.      Problem List[1]  Ht 5' 8" (1.727 m)   Wt 71.7 kg (158 lb 1.1 oz)   BMI 24.03 kg/m²   Body mass index is 24.03 kg/m².    Review of Systems   Constitutional: Negative.    HENT: Negative.     Respiratory: Negative.     Cardiovascular: Negative.    Musculoskeletal: Negative.    Gastrointestinal: Negative.    Neurological: Negative.    Psychiatric/Behavioral: Negative.         Objective:      Physical Exam  Constitutional:       Appearance: Normal appearance. He is well-developed.   HENT:      Head: Normocephalic and atraumatic.      Nose: Nose normal.   Eyes:      General: Lids are normal.   Pulmonary:      Effort: Pulmonary effort is normal. No tachypnea, bradypnea, accessory muscle usage or respiratory distress. "   Musculoskeletal:      Cervical back: Normal range of motion.   Skin:     Findings: No rash.   Neurological:      Mental Status: He is alert and oriented to person, place, and time.   Psychiatric:         Behavior: Behavior normal.         Thought Content: Thought content normal.         Judgment: Judgment normal.       Personal Diagnostic Review      5/1/2025     9:38 AM   EPWORTH SLEEPINESS SCALE   Sitting and reading 0   Watching TV 1   Sitting, inactive in a public place (e.g. a theatre or a meeting) 0   As a passenger in a car for an hour without a break 0   Lying down to rest in the afternoon when circumstances permit 2   Sitting and talking to someone 0   Sitting quietly after a lunch without alcohol 0   In a car, while stopped for a few minutes in traffic 0   Total score 3      Review of PAP download. Special study media link attached to this encounter. Normal AHI and 50%compliant.           Assessment:       1. RIVKA (obstructive sleep apnea)        Encounter Medications[2]  Orders Placed This Encounter   Procedures    CPAP/BIPAP SUPPLIES     Length of need (1-99 months)::   99     Choose ONE mask type and its corresponding cushions and/or pillows::    Full Face Mask, 1 per 90 days:  Full Face Cushion, (3 per 90 days)     Choose EITHER Heated or Non-Heated Tubjing:    Non-Heated Tubing, 1 per 90 days     All other supplies as needed as listed below::    Headgear, 1 per 180 days     All other supplies as needed as listed below::    Disposable Filter, 6 per 90 days     All other supplies as needed as listed below::    Non-Disposable Filter, 1 per 180 days     All other supplies as needed as listed below::    Humidifier Chamber, 1 per 180 days     Plan:       1. RIVKA (obstructive sleep apnea)  -     CPAP/BIPAP SUPPLIES       Assessment & Plan    IMPRESSION:   Assessed CPAP usage and compliance, noting intermittent usage pattern and positive response after consistent use.    Evaluated pressure settings and mask fit, finding them appropriate.   Considered potential causes of mouth dryness, including mask leakage and humidity settings.   Determined current prescription and supply arrangement are adequate.    OBSTRUCTIVE SLEEP APNEA:  - Informed about the option of a heated hose for additional humidity, noting it may increase heat.  - Clarified that CPAP ownership occurs after 12-13 months of payments.  - Explained the process for obtaining supplies through insurance and the need for compliance.  - Mr. Dunn to increase CPAP usage for better compliance and health benefits.      DRY MOUTH:  - Mr. Dunn to adjust humidity settings on CPAP machine if experiencing dry mouth.    FOLLOW-UP:  - Follow up in 1 year for virtual appointment.  - Contact the office if experiencing any problems with CPAP use before the next scheduled appointment.  - Contact the office if needing a prescription for CPAP supplies from alternative sources (e.g., online retailers).          This note was generated with the assistance of ambient listening technology. Verbal consent was obtained by the patient and accompanying visitor(s) for the recording of patient appointment to facilitate this note. I attest to having reviewed and edited the generated note for accuracy, though some syntax or spelling errors may persist. Please contact the author of this note for any clarification.                Elizabeth LeJeune, ACNP, ANP       [1]   Patient Active Problem List  Diagnosis    RIVKA (obstructive sleep apnea)   [2]   Outpatient Encounter Medications as of 5/1/2025   Medication Sig Dispense Refill    busPIRone (BUSPAR) 10 MG tablet TAKE 1 TABLET(10 MG) BY MOUTH THREE TIMES DAILY AS NEEDED FOR ANXIETY 90 tablet 11    propranoloL (INDERAL LA) 60 MG 24 hr capsule TAKE 2 CAPSULES(120 MG) BY MOUTH EVERY EVENING 180 capsule 1    [DISCONTINUED] valACYclovir (VALTREX) 500 MG tablet Take 1 tablet (500 mg total) by mouth 2  (two) times daily. for 5 days 10 tablet 1     No facility-administered encounter medications on file as of 5/1/2025.

## 2025-06-03 ENCOUNTER — EVALUATION (OUTPATIENT)
Dept: PSYCHIATRY | Facility: CLINIC | Age: 35
End: 2025-06-03
Payer: COMMERCIAL

## 2025-06-03 DIAGNOSIS — R41.840 DIFFICULTY CONCENTRATING: ICD-10-CM

## 2025-06-03 DIAGNOSIS — F41.1 GAD (GENERALIZED ANXIETY DISORDER): ICD-10-CM

## 2025-06-03 DIAGNOSIS — F90.9 ATTENTION DEFICIT HYPERACTIVITY DISORDER (ADHD), UNSPECIFIED ADHD TYPE: Primary | ICD-10-CM

## 2025-06-03 PROCEDURE — 99999 PR PBB SHADOW E&M-EST. PATIENT-LVL III: CPT | Mod: PBBFAC,,, | Performed by: STUDENT IN AN ORGANIZED HEALTH CARE EDUCATION/TRAINING PROGRAM

## 2025-06-03 PROCEDURE — 96130 PSYCL TST EVAL PHYS/QHP 1ST: CPT | Mod: S$GLB,,, | Performed by: STUDENT IN AN ORGANIZED HEALTH CARE EDUCATION/TRAINING PROGRAM

## 2025-06-10 ENCOUNTER — OFFICE VISIT (OUTPATIENT)
Dept: OTOLARYNGOLOGY | Facility: CLINIC | Age: 35
End: 2025-06-10
Payer: COMMERCIAL

## 2025-06-10 VITALS — BODY MASS INDEX: 25.12 KG/M2 | HEIGHT: 67 IN | WEIGHT: 160.06 LBS

## 2025-06-10 DIAGNOSIS — J34.2 NASAL SEPTAL DEVIATION: ICD-10-CM

## 2025-06-10 DIAGNOSIS — J34.89 NASAL OBSTRUCTION: Primary | ICD-10-CM

## 2025-06-10 DIAGNOSIS — J34.3 HYPERTROPHY OF BOTH INFERIOR NASAL TURBINATES: ICD-10-CM

## 2025-06-10 PROCEDURE — 99214 OFFICE O/P EST MOD 30 MIN: CPT | Mod: S$GLB,,, | Performed by: STUDENT IN AN ORGANIZED HEALTH CARE EDUCATION/TRAINING PROGRAM

## 2025-06-10 PROCEDURE — 3008F BODY MASS INDEX DOCD: CPT | Mod: CPTII,S$GLB,, | Performed by: STUDENT IN AN ORGANIZED HEALTH CARE EDUCATION/TRAINING PROGRAM

## 2025-06-10 PROCEDURE — 1159F MED LIST DOCD IN RCRD: CPT | Mod: CPTII,S$GLB,, | Performed by: STUDENT IN AN ORGANIZED HEALTH CARE EDUCATION/TRAINING PROGRAM

## 2025-06-10 PROCEDURE — 99999 PR PBB SHADOW E&M-EST. PATIENT-LVL III: CPT | Mod: PBBFAC,,, | Performed by: STUDENT IN AN ORGANIZED HEALTH CARE EDUCATION/TRAINING PROGRAM

## 2025-06-10 NOTE — PROGRESS NOTES
Chief complaint:    Chief Complaint   Patient presents with    mouth breathing     Pt dx with sleep apnea x1 year. C/o bilateral nasal congestion.States he has a hx of mouth ulcers. C/o a few today and now starting on tongue           Referring Provider:  No referring provider defined for this encounter.      History of present illness:     Mr. Dunn is a 35 y.o. presenting for evaluation of nasal obstruction and oral ulcers.       Oral ulceration noted - can be several at a time  Usually no pain or mild, no bleeding   Has had them on and off for some months  Becoming more frequent  Usually last about 1-2 weeks  Does drink a lot of acidic drinks which he feels triggers it  No oral or genital ulcers  Otherwise healthy - no AI or immune diseases  Did not try the steroid cream prescribed  Not a smoker    The patient reports the following allergy/sinus symptoms:     Major sinusitis symptoms:  No - Purulent anterior nasal discharge  No - Purulent or discolored posterior nasal discharge  Yes - Nasal congestion or obstruction (bilateral, all the time)  No - Facial Congestion or fullness  No - Hyposmia or anosmia  No - Fever    Symptoms have been present for many years  The patient has had about 0 sinus infections in the past 12 months.   Prior sinus surgery: no.    Allergy history: yes, thinks he has dog allergies. Allergy testing for this patient has not been done. Treatment has included: benadryl   oral antihistamine, nasal steroid spray    Asthma history: No    NSAID/ASA allergy: No     Current smoker:  No       Update 6/10/25  Since last visit he has been dx with RIVKA 1 year ago. He is on CPAP  He notes continued nasal obstruction, mouth breathing  Worse with exertion and sleep  Present all the time   Hard time getting air in nose with the CPAP  Denies facial pressure/pain, purulent rhinorrhea, or anosmia/hyposmia.    Prior nasal injury, but no surgery  No nasal surgery.   No significant improvement with steroid  "nasal sprays in the past.  No significant allergy symptoms.     History      Past Medical History:   Past Medical History:   Diagnosis Date    Anxiety          Past Surgical History:  Past Surgical History:   Procedure Laterality Date    HERNIA REPAIR      as an infant         Medications: Medication list reviewed. He  has a current medication list which includes the following prescription(s): buspirone and propranolol.     Allergies: Review of patient's allergies indicates:  No Known Allergies      Family history: family history includes Alcohol abuse in his father; Bipolar disorder in his father; COPD in his father; No Known Problems in his mother.         Social History          Alcohol use:  reports that he does not currently use alcohol.            Tobacco:  reports that he has never smoked. He has never been exposed to tobacco smoke. He has never used smokeless tobacco.         Physical Examination      Vitals: Height 5' 7" (1.702 m), weight 72.6 kg (160 lb 0.9 oz).      General: Well developed, well nourished, well hydrated.     Voice: no dysphonia, no dysarthria      Head/Face: Normocephalic, atraumatic. No scars or lesions. Facial musculature equal.     Eyes: No scleral icterus or conjunctival hemorrhage. EOMI. PERRLA.     Ears:     Right ear: No gross deformity. EAC is clear of debris and erythema. TM are intact with a pneumatized middle ear. No signs of retraction, fluid or infection.      Left ear: No gross deformity. EAC is clear of debris and erythema. TM are intact with a pneumatized middle ear. No signs of retraction, fluid or infection.      Nose: No gross deformity or lesions. No purulent discharge.  Moderate to severe right septal deviation with inferior spur. Inferior turbinate hypertrophy     Mouth/Oropharynx: ulceration about .8 cm of right upper lip near commissure, No other mucosal lesions within the oropharynx. No tonsillar exudate or lesions. Pharyngeal walls symmetrical. Uvula midline. " Tongue midline without lesions.     Neurologic: Moving all extremities without gross abnormality.CN II-XII grossly intact. House-Brackmann 1/6. No signs of nystagmus.        Data reviewed      Review of records:      I reviewed records from the referring provider's office visits describing the history, workup, and/or treatment of this problem thus far.    HST  MODERATE OBSTRUCTIVE SLEEP APNEA with overall AHI 13.0/hr (71 events):  night #1  Oxygen desaturation: 89%. SpO2 between 90% to 94% for 7 min.  Patient snored 95% time above 50 .  Heart rate range: 47 bpm - 74bpm  REC's:  Therapy with APAP at 6-20 cm WP using mask of choice with heated humidification is an option.  Please refer to sleep disorders clinic  Weight loss/management. with regular exercise per direction of physician.  Avoid drowsy driving.  SLEEP HYGIENE      Assessment/Plan:    1. Nasal obstruction    2. Hypertrophy of both inferior nasal turbinates    3. Nasal septal deviation        Howard  has right-sided nasal septal deviation and inferior turbinate hypertrophy resulting in persistent bilateral-sided nasal obstruction. The patient has history of allergic rhinitis which has been managed with appropriate medical treatment, yet the nasal obstruction has persisted.  We discussed treatment options including septoplasty and inferior turbinate reduction, along with continued allergy management.  Risks, benefits, and need for continued medical management after surgery were discussed.  Specific risks including bleeding, infection, CSF leak, septal perforation, saddle nose deformity, numbness of the upper teeth were discussed at length with the patient.  The patient wishes to proceed with surgery. We will schedule in August.          Darrian Mercedes MD  [facility]   [facility] [address]  [place] [address]  P: [phone]  F: [phone]

## 2025-06-11 ENCOUNTER — PATIENT MESSAGE (OUTPATIENT)
Dept: OTOLARYNGOLOGY | Facility: CLINIC | Age: 35
End: 2025-06-11
Payer: COMMERCIAL

## 2025-06-11 DIAGNOSIS — J34.2 NASAL SEPTAL DEVIATION: ICD-10-CM

## 2025-06-11 DIAGNOSIS — J34.89 NASAL OBSTRUCTION: Primary | ICD-10-CM

## 2025-06-11 DIAGNOSIS — J34.3 HYPERTROPHY OF BOTH INFERIOR NASAL TURBINATES: ICD-10-CM

## 2025-06-23 ENCOUNTER — OFFICE VISIT (OUTPATIENT)
Dept: PRIMARY CARE CLINIC | Facility: CLINIC | Age: 35
End: 2025-06-23
Payer: COMMERCIAL

## 2025-06-23 DIAGNOSIS — K21.9 GASTROESOPHAGEAL REFLUX DISEASE, UNSPECIFIED WHETHER ESOPHAGITIS PRESENT: Primary | ICD-10-CM

## 2025-06-23 DIAGNOSIS — K52.9 CHRONIC DIARRHEA: ICD-10-CM

## 2025-06-23 PROCEDURE — 98006 SYNCH AUDIO-VIDEO EST MOD 30: CPT | Mod: 95,,, | Performed by: PHYSICIAN ASSISTANT

## 2025-06-23 PROCEDURE — G2211 COMPLEX E/M VISIT ADD ON: HCPCS | Mod: 95,,, | Performed by: PHYSICIAN ASSISTANT

## 2025-06-23 RX ORDER — PANTOPRAZOLE SODIUM 40 MG/1
40 TABLET, DELAYED RELEASE ORAL DAILY
Qty: 30 TABLET | Refills: 2 | Status: SHIPPED | OUTPATIENT
Start: 2025-06-23

## 2025-06-23 NOTE — PROGRESS NOTES
"    [facility]       [address] [place] [address]      Phone: [phone]      Fax: [phone]    Nettie Conde PA-C                Video Visit  06/23/2025        Subjective      HPI:  Howard Dunn is a 35 y.o. male presents today via video for "No chief complaint on file.  ."     CHIEF COMPLAINT:  Patient presents for evaluation and management of chronic GERD and irritable bowel syndrome (IBS) symptoms, seeking prescription medication or referral to a gastroenterology specialist.    HPI:  Patient is a 35-year-old male with a history of hiatal hernia and GERD diagnosed approximately 12 years ago via endoscopy. He reports symptoms have recently worsened. He has heartburn and needs to use the bathroom within 10-20 minutes after eating, with loose stools. He has not had a normal stool in years. When he eats, his anxiety increases and his heart rate goes up, even with non-triggering foods or just water. This morning, after drinking half a cup of coffee, he had an unusually accelerated heart rate.    His bowel issues have been ongoing since high school, initially diagnosed as IBS and potentially associated with nerves and anxiety. The condition has persisted into adulthood. These symptoms significantly impact his quality of life, sometimes making him reluctant to eat certain foods.    Regarding dietary habits, he drinks soda regularly and has recently reduced his intake of energy drinks. He cooks at home more often with his girlfriend and eats out less frequently. He stopped alcohol 4 years ago and noticed some improvement in symptoms for a few years after becoming sober.    He denies abdominal pain, chest pain, or esophageal pain.    MEDICATIONS:  Patient started taking Prilosec (omeprazole) 20 mg OTC again this morning for GERD/heartburn. He discontinued prescription omeprazole approximately 6 years ago, as advised by his doctor at the time to focus on " better diet instead.    MEDICAL HISTORY:  Patient has a history of hiatal hernia and GERD, both diagnosed approximately 12 years ago when he was 23 years old. He also has a history of irritable bowel syndrome (IBS) with onset during his high school years.    SURGICAL HISTORY:  Patient underwent hernia repair surgery as an infant.    SOCIAL HISTORY:  Alcohol: Quit drinking 4 years ago  Denies tobacco use, vaping, or illicit drug use    TEST RESULTS:  Patient underwent an endoscopy 12 years ago, which led to the diagnosis of hiatal hernia and GERD.        The following were updated and reviewed by myself in the chart: medications, past medical history, past surgical history, family history, social history, and allergies.     Medications:  Medications Ordered Prior to Encounter[1]     PMHx:  Past Medical History:   Diagnosis Date    Anxiety       Patient Active Problem List    Diagnosis Date Noted    RIVKA (obstructive sleep apnea) 01/30/2024        PSHx:  Past Surgical History:   Procedure Laterality Date    HERNIA REPAIR      as an infant        FHx:  Family History   Problem Relation Name Age of Onset    No Known Problems Mother      COPD Father Howard Richarodette     Alcohol abuse Father Howard Dunn     Bipolar disorder Father Howard Dunn         Social:  Social History     Socioeconomic History    Marital status: Single   Tobacco Use    Smoking status: Never     Passive exposure: Never    Smokeless tobacco: Never   Substance and Sexual Activity    Alcohol use: Not Currently     Comment: 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     Social Drivers of Health     Financial Resource Strain: Low Risk  (6/3/2025)    Overall Financial Resource Strain (CARDIA)     Difficulty of Paying Living Expenses: Not hard at all   Food Insecurity: No Food Insecurity (6/3/2025)    Hunger Vital Sign     Worried  About Running Out of Food in the Last Year: Never true     Ran Out of Food in the Last Year: Never true   Transportation Needs: No Transportation Needs (6/3/2025)    PRAPARE - Transportation     Lack of Transportation (Medical): No     Lack of Transportation (Non-Medical): No   Physical Activity: Insufficiently Active (6/3/2025)    Exercise Vital Sign     Days of Exercise per Week: 2 days     Minutes of Exercise per Session: 30 min   Stress: No Stress Concern Present (6/3/2025)    [employer] - Occupational Stress Questionnaire     Feeling of Stress : Only a little   Housing Stability: Low Risk  (6/3/2025)    Housing Stability Vital Sign     Unable to Pay for Housing in the Last Year: No     Number of Times Moved in the Last Year: 0     Homeless in the Last Year: No        Allergies:  Review of patient's allergies indicates:  No Known Allergies     ROS:  Review of Systems   Constitutional:  Negative for activity change, appetite change and unexpected weight change.   HENT:  Negative for hearing loss, rhinorrhea and trouble swallowing.    Eyes:  Negative for discharge and visual disturbance.   Respiratory:  Negative for chest tightness, shortness of breath and wheezing.    Cardiovascular:  Negative for chest pain and palpitations.   Gastrointestinal:  Positive for diarrhea. Negative for abdominal pain, blood in stool, constipation and vomiting.   Endocrine: Negative for polydipsia and polyuria.   Genitourinary:  Positive for urgency. Negative for difficulty urinating and hematuria.   Musculoskeletal:  Negative for arthralgias, joint swelling, myalgias and neck pain.   Skin:  Negative for rash.   Neurological:  Negative for dizziness, tremors, seizures, speech difficulty, weakness, light-headedness, numbness and headaches.   Psychiatric/Behavioral:  Negative for confusion, dysphoric mood and sleep disturbance. The patient is not nervous/anxious.           Objective      There were no  "vitals taken for this visit.  Ht Readings from Last 3 Encounters:   06/23/25 5' 7" (1.702 m)   06/10/25 5' 7" (1.702 m)   05/01/25 5' 8" (1.727 m)     Wt Readings from Last 3 Encounters:   06/23/25 71.7 kg (158 lb)   06/10/25 72.6 kg (160 lb 0.9 oz)   05/01/25 71.7 kg (158 lb 1.1 oz)       PHYSICAL EXAM:  Physical Exam  Constitutional:       Appearance: Normal appearance.   Pulmonary:      Effort: No respiratory distress.   Neurological:      General: No focal deficit present.      Mental Status: He is alert and oriented to person, place, and time.              LABS / IMAGING:  No results found for this or any previous visit (from the past 26 weeks).      Assessment    1. Gastroesophageal reflux disease, unspecified whether esophagitis present    2. Chronic diarrhea          Plan    Diagnoses and all orders for this visit:    Gastroesophageal reflux disease, unspecified whether esophagitis present  -     Ambulatory referral/consult to Gastroenterology; Future    Chronic diarrhea  -     Ambulatory referral/consult to Gastroenterology; Future    Other orders  -     pantoprazole (PROTONIX) 40 MG tablet; Take 1 tablet (40 mg total) by mouth once daily.    PLAN SUMMARY:   Lifestyle modifications advised: avoid red sauce, acidic foods, chocolate; discontinue energy drinks and sodas; limit coffee; no lying down after eating; take short walks after meals   Prescribed Protonix 40 mg daily for 6-8 weeks   Referred to gastroenterology specialist for evaluation and possible endoscopy   Consider H. pylori testing   Recommend rescoping to evaluate gastric lining, esophagus, and rule out gastritis, esophagitis, or ulcers   Contact office if external referral to Dr. Feliciano (gastroenterology specialist) is needed   Follow up when office schedules earliest available appointment    GASTROESOPHAGEAL REFLUX DISEASE (GERD):   Assessed chronic gastroesophageal symptoms, noting patient reports increased heart rate, anxiety after eating, " heartburn, and worsening symptoms despite OTC Prilosec.   Recommend rescoping to evaluate gastric lining, esophagus, and rule out significant gastritis, esophagitis, or ulcers.   Will consider H. pylori testing as a potential cause.   Prescribed Protonix 40 mg daily for 6-8 weeks while awaiting further evaluation.   Advised comprehensive lifestyle modifications including: avoiding red sauce, acidic foods, and chocolate; discontinuing energy drinks and sodas completely; limiting coffee intake; not lying down immediately after eating; and taking 10-15 minute walks after meals for better digestion.    CHRONIC DIARRHEA:   Considered lower GI evaluation due to chronic diarrhea, which may be related to IBS symptoms.    REFERRAL AND FOLLOW-UP:   Referred to gastroenterology specialist for further evaluation and possible endoscopy due to multitude and chronicity of symptoms.      FOLLOW-UP:  Follow up if symptoms worsen or fail to improve.    The patient location is: in vehicle   The chief complaint leading to consultation is: gerd/diarrhea    Visit type: audiovisual    Face to Face time with patient: 10 minutes    30 minutes of total time spent on the encounter, which includes face to face time and non-face to face time preparing to see the patient (eg, review of tests), Obtaining and/or reviewing separately obtained history, Documenting clinical information in the electronic or other health record, Independently interpreting results (not separately reported) and communicating results to the patient/family/caregiver, or Care coordination (not separately reported). Visit today included increased complexity associated with the care of the episodic problem addressed and managing the longitudinal care of the patient due to the serious and/or complex managed problem(s).    Each patient to whom he or she provides medical services by telemedicine is:  (1) informed of the relationship between the physician and patient and the  respective role of any other health care provider with respect to management of the patient; and (2) notified that he or she may decline to receive medical services by telemedicine and may withdraw from such care at any time.    Signed by:  Nettie Conde PA-C                               [1]   Current Outpatient Medications on File Prior to Visit   Medication Sig Dispense Refill    busPIRone (BUSPAR) 10 MG tablet TAKE 1 TABLET(10 MG) BY MOUTH THREE TIMES DAILY AS NEEDED FOR ANXIETY 90 tablet 11    propranoloL (INDERAL LA) 60 MG 24 hr capsule TAKE 2 CAPSULES(120 MG) BY MOUTH EVERY EVENING 180 capsule 1    [DISCONTINUED] omeprazole (PRILOSEC) 20 MG capsule Take 20 mg by mouth 2 (two) times a day.       No current facility-administered medications on file prior to visit.

## 2025-07-03 ENCOUNTER — TELEPHONE (OUTPATIENT)
Dept: PRIMARY CARE CLINIC | Facility: CLINIC | Age: 35
End: 2025-07-03
Payer: COMMERCIAL

## 2025-07-31 ENCOUNTER — OFFICE VISIT (OUTPATIENT)
Dept: GASTROENTEROLOGY | Facility: CLINIC | Age: 35
End: 2025-07-31
Payer: COMMERCIAL

## 2025-07-31 DIAGNOSIS — K21.9 GASTROESOPHAGEAL REFLUX DISEASE, UNSPECIFIED WHETHER ESOPHAGITIS PRESENT: ICD-10-CM

## 2025-07-31 DIAGNOSIS — K52.9 CHRONIC DIARRHEA: ICD-10-CM

## 2025-07-31 RX ORDER — FAMOTIDINE 40 MG/1
40 TABLET, FILM COATED ORAL 2 TIMES DAILY PRN
Qty: 90 TABLET | Refills: 3 | Status: SHIPPED | OUTPATIENT
Start: 2025-07-31

## 2025-07-31 NOTE — PROGRESS NOTES
Clinic Consult:  [facility] Consultation Note    Reason for Consult:  Diagnoses of Gastroesophageal reflux disease, unspecified whether esophagitis present and Chronic diarrhea were pertinent to this visit.    PCP: Gilberto Saldivar   [address] / [place] [address]    The patient location is: [place]   The chief complaint leading to consultation is: GERD, diarrhea     Visit type: audiovisual    Face to Face time with patient: 15 minutes   20 minutes of total time spent on the encounter, which includes face to face time and non-face to face time preparing to see the patient (eg, review of tests), Obtaining and/or reviewing separately obtained history, Documenting clinical information in the electronic or other health record, Independently interpreting results (not separately reported) and communicating results to the patient/family/caregiver, or Care coordination (not separately reported).         Each patient to whom he or she provides medical services by telemedicine is:  (1) informed of the relationship between the physician and patient and the respective role of any other health care provider with respect to management of the patient; and (2) notified that he or she may decline to receive medical services by telemedicine and may withdraw from such care at any time.    Notes:     HPI:  This is a 35 y.o. male here for evaluation of the above.   He has chronic GI issues that started 10-12 years ago.    # GERD  -he used to be on omeprazole several years ago  -he recently had severe dyspepsia.  He saw his PCP who prescribed pantoprazole, however, he has not started this.  -he has stopped all carbonated drinks/soda, energy drinks, and coffee.  He does not eat spicy foods or fried foods.  This has drastically improved his symptoms.  -currently, his reflux is well controlled.  He only has breakthrough symptoms less than once a week.  - he had an EGD 10-12 years ago when he was diagnosed  with GERD.  Also has hiatal hernia.    # IBS-D  - has always had loose bowel movements.  -he is currently having about 2 stools per day.  One in the morning and 1 in the evening.  These bowel movements are loose.  He may also go a day without a bowel movement.  Eating sometimes does trigger his bowel movements.  -he denies any hematochezia or melena.  No abdominal pain.    Review of Systems   Constitutional:  Negative for fever and weight loss.   HENT:  Negative for sore throat.    Gastrointestinal:  Positive for diarrhea and heartburn. Negative for constipation, melena, nausea and vomiting.   Skin:  Negative for itching and rash.       Medical History:  has a past medical history of Anxiety.    Surgical History:  has a past surgical history that includes Hernia repair.    Family History: family history includes Alcohol abuse in his father; Bipolar disorder in his father; COPD in his father; No Known Problems in his mother..     Social History:  reports that he has never smoked. He has never been exposed to tobacco smoke. He has never used smokeless tobacco. He reports that he does not currently use alcohol. He reports that he does not currently use drugs.    Allergies: Reviewed    Home Medications:   Medications Ordered Prior to Encounter[1]    Physical Exam:  There were no vitals taken for this visit.  There is no height or weight on file to calculate BMI.  Physical Exam  Constitutional:       General: He is not in acute distress.  HENT:      Head: Normocephalic.   Neurological:      General: No focal deficit present.      Mental Status: He is alert.   Psychiatric:         Mood and Affect: Mood normal.         Judgment: Judgment normal.         Labs: Pertinent labs reviewed.  CRC Screening:     Assessment:  1. Gastroesophageal reflux disease, unspecified whether esophagitis present    2. Chronic diarrhea    GERD is pretty well controlled with diet. IBS-D improved some since stopping caffeine.     Recommendations:    - start Metamucil daily   - can use PRN medication for occasional symptoms. Better to use Pepcid PRN over pantoprazole PRN  - if symptoms return or become more frequent then restart pantoprazole daily   - EGD and colonoscopy if symptoms worsen.     Gastroesophageal reflux disease, unspecified whether esophagitis present  -     Ambulatory referral/consult to Gastroenterology  -     famotidine (PEPCID) 40 MG tablet; Take 1 tablet (40 mg total) by mouth 2 (two) times daily as needed for Heartburn.  Dispense: 90 tablet; Refill: 3    Chronic diarrhea  -     Ambulatory referral/consult to Gastroenterology    Follow up if symptoms worsen or fail to improve.    Thank you so much for allowing me to participate in the care of DARWIN Link         [1]   Current Outpatient Medications on File Prior to Visit   Medication Sig Dispense Refill    busPIRone (BUSPAR) 10 MG tablet TAKE 1 TABLET(10 MG) BY MOUTH THREE TIMES DAILY AS NEEDED FOR ANXIETY 90 tablet 11    propranoloL (INDERAL LA) 60 MG 24 hr capsule Take 2 capsules (120 mg total) by mouth every evening. 180 capsule 0    [DISCONTINUED] pantoprazole (PROTONIX) 40 MG tablet Take 1 tablet (40 mg total) by mouth once daily. 30 tablet 2     No current facility-administered medications on file prior to visit.

## 2025-08-14 ENCOUNTER — PATIENT MESSAGE (OUTPATIENT)
Dept: PREADMISSION TESTING | Facility: HOSPITAL | Age: 35
End: 2025-08-14
Payer: COMMERCIAL

## 2025-08-14 DIAGNOSIS — Z01.818 PRE-OP TESTING: Primary | ICD-10-CM

## 2025-08-15 ENCOUNTER — LAB VISIT (OUTPATIENT)
Dept: LAB | Facility: HOSPITAL | Age: 35
End: 2025-08-15
Attending: PHYSICIAN ASSISTANT
Payer: COMMERCIAL

## 2025-08-15 DIAGNOSIS — Z01.818 PRE-OP TESTING: ICD-10-CM

## 2025-08-15 LAB
ALBUMIN SERPL BCP-MCNC: 4.4 G/DL (ref 3.5–5.2)
ALP SERPL-CCNC: 83 UNIT/L (ref 40–150)
ALT SERPL W/O P-5'-P-CCNC: 10 UNIT/L (ref 10–44)
ANION GAP (OHS): 10 MMOL/L (ref 8–16)
AST SERPL-CCNC: 23 UNIT/L (ref 11–45)
BILIRUB SERPL-MCNC: 0.6 MG/DL (ref 0.1–1)
BUN SERPL-MCNC: 12 MG/DL (ref 6–20)
CALCIUM SERPL-MCNC: 9.2 MG/DL (ref 8.7–10.5)
CHLORIDE SERPL-SCNC: 105 MMOL/L (ref 95–110)
CO2 SERPL-SCNC: 25 MMOL/L (ref 23–29)
CREAT SERPL-MCNC: 0.9 MG/DL (ref 0.5–1.4)
ERYTHROCYTE [DISTWIDTH] IN BLOOD BY AUTOMATED COUNT: 11.9 % (ref 11.5–14.5)
GFR SERPLBLD CREATININE-BSD FMLA CKD-EPI: >60 ML/MIN/1.73/M2
GLUCOSE SERPL-MCNC: 117 MG/DL (ref 70–110)
HCT VFR BLD AUTO: 44.4 % (ref 40–54)
HGB BLD-MCNC: 15.4 GM/DL (ref 14–18)
MCH RBC QN AUTO: 30.3 PG (ref 27–31)
MCHC RBC AUTO-ENTMCNC: 34.7 G/DL (ref 32–36)
MCV RBC AUTO: 87 FL (ref 82–98)
PLATELET # BLD AUTO: 280 K/UL (ref 150–450)
PMV BLD AUTO: 9.5 FL (ref 9.2–12.9)
POTASSIUM SERPL-SCNC: 3.8 MMOL/L (ref 3.5–5.1)
PROT SERPL-MCNC: 7.5 GM/DL (ref 6–8.4)
RBC # BLD AUTO: 5.09 M/UL (ref 4.6–6.2)
SODIUM SERPL-SCNC: 140 MMOL/L (ref 136–145)
WBC # BLD AUTO: 8.24 K/UL (ref 3.9–12.7)

## 2025-08-15 PROCEDURE — 36415 COLL VENOUS BLD VENIPUNCTURE: CPT | Mod: PO

## 2025-08-15 PROCEDURE — 85027 COMPLETE CBC AUTOMATED: CPT

## 2025-08-15 PROCEDURE — 80053 COMPREHEN METABOLIC PANEL: CPT

## 2025-08-19 ENCOUNTER — ANESTHESIA EVENT (OUTPATIENT)
Dept: SURGERY | Facility: HOSPITAL | Age: 35
End: 2025-08-19
Payer: COMMERCIAL

## 2025-08-19 ENCOUNTER — PATIENT MESSAGE (OUTPATIENT)
Dept: RESPIRATORY THERAPY | Facility: HOSPITAL | Age: 35
End: 2025-08-19
Payer: COMMERCIAL

## 2025-08-20 ENCOUNTER — TELEPHONE (OUTPATIENT)
Dept: OTOLARYNGOLOGY | Facility: CLINIC | Age: 35
End: 2025-08-20

## 2025-08-20 ENCOUNTER — HOSPITAL ENCOUNTER (OUTPATIENT)
Facility: HOSPITAL | Age: 35
Discharge: HOME | End: 2025-08-20
Attending: STUDENT IN AN ORGANIZED HEALTH CARE EDUCATION/TRAINING PROGRAM | Admitting: STUDENT IN AN ORGANIZED HEALTH CARE EDUCATION/TRAINING PROGRAM
Payer: COMMERCIAL

## 2025-08-20 ENCOUNTER — PATIENT MESSAGE (OUTPATIENT)
Dept: PRIMARY CARE CLINIC | Facility: CLINIC | Age: 35
End: 2025-08-20
Payer: COMMERCIAL

## 2025-08-20 ENCOUNTER — ANESTHESIA (OUTPATIENT)
Dept: SURGERY | Facility: HOSPITAL | Age: 35
End: 2025-08-20
Payer: COMMERCIAL

## 2025-08-20 VITALS
TEMPERATURE: 98 F | WEIGHT: 152.13 LBS | HEART RATE: 53 BPM | OXYGEN SATURATION: 100 % | HEIGHT: 67 IN | SYSTOLIC BLOOD PRESSURE: 165 MMHG | RESPIRATION RATE: 19 BRPM | DIASTOLIC BLOOD PRESSURE: 80 MMHG | BODY MASS INDEX: 23.88 KG/M2

## 2025-08-20 DIAGNOSIS — J34.89 NASAL OBSTRUCTION: ICD-10-CM

## 2025-08-20 DIAGNOSIS — J34.2 NASAL SEPTAL DEVIATION: ICD-10-CM

## 2025-08-20 DIAGNOSIS — G47.33 OSA (OBSTRUCTIVE SLEEP APNEA): ICD-10-CM

## 2025-08-20 DIAGNOSIS — J34.3 HYPERTROPHY OF BOTH INFERIOR NASAL TURBINATES: Primary | ICD-10-CM

## 2025-08-20 PROCEDURE — 27201423 OPTIME MED/SURG SUP & DEVICES STERILE SUPPLY: Performed by: STUDENT IN AN ORGANIZED HEALTH CARE EDUCATION/TRAINING PROGRAM

## 2025-08-20 PROCEDURE — 36000706: Performed by: STUDENT IN AN ORGANIZED HEALTH CARE EDUCATION/TRAINING PROGRAM

## 2025-08-20 PROCEDURE — 71000033 HC RECOVERY, INTIAL HOUR: Performed by: STUDENT IN AN ORGANIZED HEALTH CARE EDUCATION/TRAINING PROGRAM

## 2025-08-20 PROCEDURE — 37000009 HC ANESTHESIA EA ADD 15 MINS: Performed by: STUDENT IN AN ORGANIZED HEALTH CARE EDUCATION/TRAINING PROGRAM

## 2025-08-20 PROCEDURE — 63600175 PHARM REV CODE 636 W HCPCS: Performed by: NURSE ANESTHETIST, CERTIFIED REGISTERED

## 2025-08-20 PROCEDURE — 25000003 PHARM REV CODE 250: Performed by: NURSE ANESTHETIST, CERTIFIED REGISTERED

## 2025-08-20 PROCEDURE — 30802 ABLATE INF TURBINATE SUBMUC: CPT | Mod: 51,,, | Performed by: STUDENT IN AN ORGANIZED HEALTH CARE EDUCATION/TRAINING PROGRAM

## 2025-08-20 PROCEDURE — 25000003 PHARM REV CODE 250: Performed by: ANESTHESIOLOGY

## 2025-08-20 PROCEDURE — 63600175 PHARM REV CODE 636 W HCPCS: Performed by: STUDENT IN AN ORGANIZED HEALTH CARE EDUCATION/TRAINING PROGRAM

## 2025-08-20 PROCEDURE — 71000015 HC POSTOP RECOV 1ST HR: Performed by: STUDENT IN AN ORGANIZED HEALTH CARE EDUCATION/TRAINING PROGRAM

## 2025-08-20 PROCEDURE — 25000003 PHARM REV CODE 250: Performed by: STUDENT IN AN ORGANIZED HEALTH CARE EDUCATION/TRAINING PROGRAM

## 2025-08-20 PROCEDURE — 63600175 PHARM REV CODE 636 W HCPCS: Performed by: ANESTHESIOLOGY

## 2025-08-20 PROCEDURE — 36000707: Performed by: STUDENT IN AN ORGANIZED HEALTH CARE EDUCATION/TRAINING PROGRAM

## 2025-08-20 PROCEDURE — 30520 REPAIR OF NASAL SEPTUM: CPT | Mod: ,,, | Performed by: STUDENT IN AN ORGANIZED HEALTH CARE EDUCATION/TRAINING PROGRAM

## 2025-08-20 PROCEDURE — 37000008 HC ANESTHESIA 1ST 15 MINUTES: Performed by: STUDENT IN AN ORGANIZED HEALTH CARE EDUCATION/TRAINING PROGRAM

## 2025-08-20 RX ORDER — ACETAMINOPHEN 325 MG/1
650 TABLET ORAL ONCE
Status: COMPLETED | OUTPATIENT
Start: 2025-08-20 | End: 2025-08-20

## 2025-08-20 RX ORDER — PHENYLEPHRINE HYDROCHLORIDE 10 MG/ML
INJECTION INTRAVENOUS
Status: DISCONTINUED | OUTPATIENT
Start: 2025-08-20 | End: 2025-08-20

## 2025-08-20 RX ORDER — HYDROMORPHONE HYDROCHLORIDE 2 MG/ML
0.2 INJECTION, SOLUTION INTRAMUSCULAR; INTRAVENOUS; SUBCUTANEOUS EVERY 5 MIN PRN
Status: DISCONTINUED | OUTPATIENT
Start: 2025-08-20 | End: 2025-08-20 | Stop reason: HOSPADM

## 2025-08-20 RX ORDER — HYDROCODONE BITARTRATE AND ACETAMINOPHEN 5; 325 MG/1; MG/1
1 TABLET ORAL EVERY 6 HOURS PRN
Qty: 20 TABLET | Refills: 0 | Status: CANCELLED | OUTPATIENT
Start: 2025-08-20

## 2025-08-20 RX ORDER — OXYMETAZOLINE HCL 0.05 %
SPRAY, NON-AEROSOL (ML) NASAL
Status: DISCONTINUED
Start: 2025-08-20 | End: 2025-08-20 | Stop reason: HOSPADM

## 2025-08-20 RX ORDER — ROCURONIUM BROMIDE 10 MG/ML
INJECTION, SOLUTION INTRAVENOUS
Status: DISCONTINUED | OUTPATIENT
Start: 2025-08-20 | End: 2025-08-20

## 2025-08-20 RX ORDER — ONDANSETRON HYDROCHLORIDE 2 MG/ML
INJECTION, SOLUTION INTRAMUSCULAR; INTRAVENOUS
Status: DISCONTINUED | OUTPATIENT
Start: 2025-08-20 | End: 2025-08-20

## 2025-08-20 RX ORDER — MIDAZOLAM HYDROCHLORIDE 1 MG/ML
INJECTION INTRAMUSCULAR; INTRAVENOUS
Status: DISCONTINUED | OUTPATIENT
Start: 2025-08-20 | End: 2025-08-20

## 2025-08-20 RX ORDER — FENTANYL CITRATE 50 UG/ML
25 INJECTION, SOLUTION INTRAMUSCULAR; INTRAVENOUS EVERY 5 MIN PRN
Status: DISCONTINUED | OUTPATIENT
Start: 2025-08-20 | End: 2025-08-20 | Stop reason: HOSPADM

## 2025-08-20 RX ORDER — ALBUTEROL SULFATE 0.83 MG/ML
2.5 SOLUTION RESPIRATORY (INHALATION) EVERY 4 HOURS PRN
Status: DISCONTINUED | OUTPATIENT
Start: 2025-08-20 | End: 2025-08-20 | Stop reason: HOSPADM

## 2025-08-20 RX ORDER — ONDANSETRON HYDROCHLORIDE 2 MG/ML
4 INJECTION, SOLUTION INTRAVENOUS ONCE AS NEEDED
Status: DISCONTINUED | OUTPATIENT
Start: 2025-08-20 | End: 2025-08-20 | Stop reason: HOSPADM

## 2025-08-20 RX ORDER — PROPOFOL 10 MG/ML
VIAL (ML) INTRAVENOUS
Status: DISCONTINUED | OUTPATIENT
Start: 2025-08-20 | End: 2025-08-20

## 2025-08-20 RX ORDER — SODIUM CHLORIDE, SODIUM LACTATE, POTASSIUM CHLORIDE, CALCIUM CHLORIDE 600; 310; 30; 20 MG/100ML; MG/100ML; MG/100ML; MG/100ML
INJECTION, SOLUTION INTRAVENOUS CONTINUOUS
Status: DISCONTINUED | OUTPATIENT
Start: 2025-08-20 | End: 2025-08-20 | Stop reason: HOSPADM

## 2025-08-20 RX ORDER — OXYMETAZOLINE HCL 0.05 %
SPRAY, NON-AEROSOL (ML) NASAL
Status: DISCONTINUED | OUTPATIENT
Start: 2025-08-20 | End: 2025-08-20 | Stop reason: HOSPADM

## 2025-08-20 RX ORDER — BACITRACIN ZINC 500 [USP'U]/G
OINTMENT TOPICAL
Status: DISCONTINUED
Start: 2025-08-20 | End: 2025-08-20 | Stop reason: HOSPADM

## 2025-08-20 RX ORDER — HYDROCODONE BITARTRATE AND ACETAMINOPHEN 5; 325 MG/1; MG/1
1 TABLET ORAL
Status: DISCONTINUED | OUTPATIENT
Start: 2025-08-20 | End: 2025-08-20 | Stop reason: HOSPADM

## 2025-08-20 RX ORDER — DEXAMETHASONE SODIUM PHOSPHATE 4 MG/ML
INJECTION, SOLUTION INTRA-ARTICULAR; INTRALESIONAL; INTRAMUSCULAR; INTRAVENOUS; SOFT TISSUE
Status: DISCONTINUED | OUTPATIENT
Start: 2025-08-20 | End: 2025-08-20

## 2025-08-20 RX ORDER — FENTANYL CITRATE 50 UG/ML
INJECTION, SOLUTION INTRAMUSCULAR; INTRAVENOUS
Status: DISCONTINUED | OUTPATIENT
Start: 2025-08-20 | End: 2025-08-20

## 2025-08-20 RX ORDER — DIPHENHYDRAMINE HYDROCHLORIDE 50 MG/ML
25 INJECTION, SOLUTION INTRAMUSCULAR; INTRAVENOUS EVERY 6 HOURS PRN
Status: DISCONTINUED | OUTPATIENT
Start: 2025-08-20 | End: 2025-08-20 | Stop reason: HOSPADM

## 2025-08-20 RX ORDER — CEFAZOLIN SODIUM 1 G/3ML
INJECTION, POWDER, FOR SOLUTION INTRAMUSCULAR; INTRAVENOUS
Status: DISCONTINUED | OUTPATIENT
Start: 2025-08-20 | End: 2025-08-20

## 2025-08-20 RX ADMIN — DEXAMETHASONE SODIUM PHOSPHATE 8 MG: 4 INJECTION, SOLUTION INTRA-ARTICULAR; INTRALESIONAL; INTRAMUSCULAR; INTRAVENOUS; SOFT TISSUE at 08:08

## 2025-08-20 RX ADMIN — SUGAMMADEX 200 MG: 100 INJECTION, SOLUTION INTRAVENOUS at 08:08

## 2025-08-20 RX ADMIN — PHENYLEPHRINE HYDROCHLORIDE 100 MCG: 10 INJECTION INTRAVENOUS at 08:08

## 2025-08-20 RX ADMIN — MIDAZOLAM 2 MG: 1 INJECTION INTRAMUSCULAR; INTRAVENOUS at 08:08

## 2025-08-20 RX ADMIN — SUGAMMADEX 100 MG: 100 INJECTION, SOLUTION INTRAVENOUS at 09:08

## 2025-08-20 RX ADMIN — SODIUM CHLORIDE, POTASSIUM CHLORIDE, SODIUM LACTATE AND CALCIUM CHLORIDE: 600; 310; 30; 20 INJECTION, SOLUTION INTRAVENOUS at 08:08

## 2025-08-20 RX ADMIN — CEFAZOLIN 2 G: 330 INJECTION, POWDER, FOR SOLUTION INTRAMUSCULAR; INTRAVENOUS at 08:08

## 2025-08-20 RX ADMIN — ROCURONIUM BROMIDE 40 MG: 10 SOLUTION INTRAVENOUS at 08:08

## 2025-08-20 RX ADMIN — SODIUM CHLORIDE, POTASSIUM CHLORIDE, SODIUM LACTATE AND CALCIUM CHLORIDE: 600; 310; 30; 20 INJECTION, SOLUTION INTRAVENOUS at 09:08

## 2025-08-20 RX ADMIN — FENTANYL CITRATE 100 MCG: 0.05 INJECTION, SOLUTION INTRAMUSCULAR; INTRAVENOUS at 08:08

## 2025-08-20 RX ADMIN — PROPOFOL 200 MG: 10 INJECTION, EMULSION INTRAVENOUS at 08:08

## 2025-08-20 RX ADMIN — ACETAMINOPHEN 650 MG: 325 TABLET ORAL at 09:08

## 2025-08-20 RX ADMIN — ONDANSETRON 4 MG: 2 INJECTION INTRAMUSCULAR; INTRAVENOUS at 08:08

## 2025-08-21 ENCOUNTER — OFFICE VISIT (OUTPATIENT)
Dept: PRIMARY CARE CLINIC | Facility: CLINIC | Age: 35
End: 2025-08-21
Payer: COMMERCIAL

## 2025-08-21 DIAGNOSIS — F95.0 TRANSIENT MOTOR TIC: ICD-10-CM

## 2025-08-21 DIAGNOSIS — F32.A ANXIETY AND DEPRESSION: ICD-10-CM

## 2025-08-21 DIAGNOSIS — Z13.6 ENCOUNTER FOR LIPID SCREENING FOR CARDIOVASCULAR DISEASE: ICD-10-CM

## 2025-08-21 DIAGNOSIS — R00.0 TACHYCARDIA: ICD-10-CM

## 2025-08-21 DIAGNOSIS — G47.33 OSA (OBSTRUCTIVE SLEEP APNEA): ICD-10-CM

## 2025-08-21 DIAGNOSIS — Z13.220 ENCOUNTER FOR LIPID SCREENING FOR CARDIOVASCULAR DISEASE: ICD-10-CM

## 2025-08-21 DIAGNOSIS — R03.0 ELEVATED BLOOD PRESSURE READING: ICD-10-CM

## 2025-08-21 DIAGNOSIS — R25.3 MUSCLE TWITCHING: ICD-10-CM

## 2025-08-21 DIAGNOSIS — F41.9 ANXIETY AND DEPRESSION: ICD-10-CM

## 2025-08-21 DIAGNOSIS — R73.09 ELEVATED GLUCOSE: Primary | ICD-10-CM

## 2025-08-21 RX ORDER — PROPRANOLOL HYDROCHLORIDE 60 MG/1
120 CAPSULE, EXTENDED RELEASE ORAL NIGHTLY
Qty: 180 CAPSULE | Refills: 1 | Status: SHIPPED | OUTPATIENT
Start: 2025-08-21

## 2025-08-25 ENCOUNTER — OFFICE VISIT (OUTPATIENT)
Dept: OTOLARYNGOLOGY | Facility: CLINIC | Age: 35
End: 2025-08-25
Payer: COMMERCIAL

## 2025-08-25 DIAGNOSIS — J34.3 HYPERTROPHY OF BOTH INFERIOR NASAL TURBINATES: Primary | ICD-10-CM

## 2025-08-25 DIAGNOSIS — J34.2 NASAL SEPTAL DEVIATION: ICD-10-CM

## 2025-08-25 DIAGNOSIS — J34.89 NASAL OBSTRUCTION: ICD-10-CM

## 2025-08-25 PROCEDURE — 1159F MED LIST DOCD IN RCRD: CPT | Mod: CPTII,S$GLB,, | Performed by: STUDENT IN AN ORGANIZED HEALTH CARE EDUCATION/TRAINING PROGRAM

## 2025-08-25 PROCEDURE — 99999 PR PBB SHADOW E&M-EST. PATIENT-LVL II: CPT | Mod: PBBFAC,,, | Performed by: STUDENT IN AN ORGANIZED HEALTH CARE EDUCATION/TRAINING PROGRAM

## 2025-08-25 PROCEDURE — 99024 POSTOP FOLLOW-UP VISIT: CPT | Mod: S$GLB,,, | Performed by: STUDENT IN AN ORGANIZED HEALTH CARE EDUCATION/TRAINING PROGRAM

## (undated) DEVICE — NDL HYPO 27G X 1 1/2

## (undated) DEVICE — STRIP MEDI WND CLSR 1/2X4IN

## (undated) DEVICE — MANIFOLD 4 PORT

## (undated) DEVICE — SUT 4-0 CHROMIC GUT / SH

## (undated) DEVICE — SUT 5/0 18IN PLAIN FAST AB

## (undated) DEVICE — SUT PROLENE 3-0 SH DA 36 BL

## (undated) DEVICE — GLOVE SURG BIOGEL LTX PF SZ 8

## (undated) DEVICE — SYR B-D DISP CONTROL 10CC100/C

## (undated) DEVICE — ELECTRODE REM PLYHSV RETURN 9

## (undated) DEVICE — KIT TURNOVER

## (undated) DEVICE — SPONGE PATTY SURGICAL .5X3IN

## (undated) DEVICE — GOWN SMARTGOWN LVL4 X-LONG XL

## (undated) DEVICE — PACK BASIC SETUP SC BR

## (undated) DEVICE — DRESSING TRANS 2X2 TEGADERM

## (undated) DEVICE — BLADE INFERIOR TURBINATE 2MM

## (undated) DEVICE — DRAPE ORTH SPLIT 77X108IN

## (undated) DEVICE — TUBING SUCTION STRAIGHT .25X20

## (undated) DEVICE — TOWEL OR DISP STRL BLUE 4/PK

## (undated) DEVICE — Device